# Patient Record
Sex: FEMALE | Race: BLACK OR AFRICAN AMERICAN | Employment: UNEMPLOYED | URBAN - METROPOLITAN AREA
[De-identification: names, ages, dates, MRNs, and addresses within clinical notes are randomized per-mention and may not be internally consistent; named-entity substitution may affect disease eponyms.]

---

## 2017-08-02 ENCOUNTER — OFFICE VISIT (OUTPATIENT)
Dept: OBGYN CLINIC | Age: 52
End: 2017-08-02
Payer: MEDICARE

## 2017-08-02 VITALS
DIASTOLIC BLOOD PRESSURE: 78 MMHG | RESPIRATION RATE: 18 BRPM | HEART RATE: 81 BPM | HEIGHT: 64 IN | WEIGHT: 293 LBS | BODY MASS INDEX: 50.02 KG/M2 | OXYGEN SATURATION: 99 % | SYSTOLIC BLOOD PRESSURE: 121 MMHG

## 2017-08-02 DIAGNOSIS — N95.0 POSTMENOPAUSAL BLEEDING: Primary | ICD-10-CM

## 2017-08-02 DIAGNOSIS — E66.01 MORBID OBESITY WITH BMI OF 60.0-69.9, ADULT (HCC): ICD-10-CM

## 2017-08-02 PROCEDURE — 4004F PT TOBACCO SCREEN RCVD TLK: CPT | Performed by: SPECIALIST

## 2017-08-02 PROCEDURE — 3014F SCREEN MAMMO DOC REV: CPT | Performed by: SPECIALIST

## 2017-08-02 PROCEDURE — 3017F COLORECTAL CA SCREEN DOC REV: CPT | Performed by: SPECIALIST

## 2017-08-02 PROCEDURE — G8427 DOCREV CUR MEDS BY ELIG CLIN: HCPCS | Performed by: SPECIALIST

## 2017-08-02 PROCEDURE — G8417 CALC BMI ABV UP PARAM F/U: HCPCS | Performed by: SPECIALIST

## 2017-08-02 PROCEDURE — 99213 OFFICE O/P EST LOW 20 MIN: CPT | Performed by: SPECIALIST

## 2017-08-02 ASSESSMENT — ENCOUNTER SYMPTOMS
DIARRHEA: 0
COUGH: 0
ABDOMINAL PAIN: 0
APNEA: 0
NAUSEA: 0
ABDOMINAL DISTENTION: 0
EYE PAIN: 0
CONSTIPATION: 0
VOMITING: 0

## 2017-08-14 ENCOUNTER — OFFICE VISIT (OUTPATIENT)
Dept: OBGYN CLINIC | Age: 52
End: 2017-08-14
Payer: MEDICARE

## 2017-08-14 DIAGNOSIS — N95.0 POST-MENOPAUSAL BLEEDING: Primary | ICD-10-CM

## 2017-08-14 PROCEDURE — 76856 US EXAM PELVIC COMPLETE: CPT | Performed by: SPECIALIST

## 2017-08-14 PROCEDURE — 4004F PT TOBACCO SCREEN RCVD TLK: CPT | Performed by: SPECIALIST

## 2017-09-08 ENCOUNTER — OFFICE VISIT (OUTPATIENT)
Dept: OBGYN CLINIC | Age: 52
End: 2017-09-08
Payer: MEDICARE

## 2017-09-08 VITALS
BODY MASS INDEX: 58.22 KG/M2 | DIASTOLIC BLOOD PRESSURE: 74 MMHG | HEART RATE: 81 BPM | WEIGHT: 293 LBS | RESPIRATION RATE: 16 BRPM | SYSTOLIC BLOOD PRESSURE: 116 MMHG

## 2017-09-08 DIAGNOSIS — R93.89 THICKENED ENDOMETRIUM: Primary | ICD-10-CM

## 2017-09-08 DIAGNOSIS — N95.0 POSTMENOPAUSAL BLEEDING: ICD-10-CM

## 2017-09-08 PROCEDURE — G8427 DOCREV CUR MEDS BY ELIG CLIN: HCPCS | Performed by: SPECIALIST

## 2017-09-08 PROCEDURE — G8417 CALC BMI ABV UP PARAM F/U: HCPCS | Performed by: SPECIALIST

## 2017-09-08 PROCEDURE — 3017F COLORECTAL CA SCREEN DOC REV: CPT | Performed by: SPECIALIST

## 2017-09-08 PROCEDURE — 3014F SCREEN MAMMO DOC REV: CPT | Performed by: SPECIALIST

## 2017-09-08 PROCEDURE — 4004F PT TOBACCO SCREEN RCVD TLK: CPT | Performed by: SPECIALIST

## 2017-09-08 PROCEDURE — 99213 OFFICE O/P EST LOW 20 MIN: CPT | Performed by: SPECIALIST

## 2017-09-08 ASSESSMENT — ENCOUNTER SYMPTOMS
COUGH: 0
DIARRHEA: 0
NAUSEA: 0
APNEA: 0
VOMITING: 0
ABDOMINAL PAIN: 0
ABDOMINAL DISTENTION: 0
EYE PAIN: 0
CONSTIPATION: 0

## 2017-09-11 ENCOUNTER — TELEPHONE (OUTPATIENT)
Dept: OBGYN CLINIC | Age: 52
End: 2017-09-11

## 2017-11-21 ENCOUNTER — HOSPITAL ENCOUNTER (OUTPATIENT)
Dept: PREADMISSION TESTING | Age: 52
Discharge: HOME | End: 2017-11-21
Payer: MEDICARE

## 2017-11-30 ENCOUNTER — HOSPITAL ENCOUNTER (OUTPATIENT)
Dept: PREADMISSION TESTING | Age: 52
Discharge: HOME | End: 2017-11-30
Payer: MEDICARE

## 2017-11-30 ENCOUNTER — OFFICE VISIT (OUTPATIENT)
Dept: OBGYN CLINIC | Age: 52
End: 2017-11-30
Payer: MEDICARE

## 2017-11-30 VITALS
OXYGEN SATURATION: 95 % | BODY MASS INDEX: 50.02 KG/M2 | SYSTOLIC BLOOD PRESSURE: 181 MMHG | HEIGHT: 64 IN | HEART RATE: 92 BPM | DIASTOLIC BLOOD PRESSURE: 90 MMHG | RESPIRATION RATE: 20 BRPM | TEMPERATURE: 98.1 F | WEIGHT: 293 LBS

## 2017-11-30 DIAGNOSIS — R93.89 THICKENED ENDOMETRIUM: Primary | ICD-10-CM

## 2017-11-30 DIAGNOSIS — N95.0 POSTMENOPAUSAL BLEEDING: ICD-10-CM

## 2017-11-30 LAB
ABSOLUTE EOS #: 0.1 K/UL (ref 0–0.4)
ABSOLUTE IMMATURE GRANULOCYTE: ABNORMAL K/UL (ref 0–0.3)
ABSOLUTE LYMPH #: 2.1 K/UL (ref 1–4.8)
ABSOLUTE MONO #: 0.5 K/UL (ref 0.1–1.3)
ANION GAP SERPL CALCULATED.3IONS-SCNC: 10 MMOL/L (ref 9–17)
BASOPHILS # BLD: 1 % (ref 0–2)
BASOPHILS ABSOLUTE: 0 K/UL (ref 0–0.2)
BUN BLDV-MCNC: 15 MG/DL (ref 6–20)
BUN/CREAT BLD: ABNORMAL (ref 9–20)
CALCIUM SERPL-MCNC: 9.4 MG/DL (ref 8.6–10.4)
CHLORIDE BLD-SCNC: 100 MMOL/L (ref 98–107)
CO2: 29 MMOL/L (ref 20–31)
CREAT SERPL-MCNC: 0.72 MG/DL (ref 0.5–0.9)
DIFFERENTIAL TYPE: ABNORMAL
EOSINOPHILS RELATIVE PERCENT: 2 % (ref 0–4)
GFR AFRICAN AMERICAN: >60 ML/MIN
GFR NON-AFRICAN AMERICAN: >60 ML/MIN
GFR SERPL CREATININE-BSD FRML MDRD: ABNORMAL ML/MIN/{1.73_M2}
GFR SERPL CREATININE-BSD FRML MDRD: ABNORMAL ML/MIN/{1.73_M2}
GLUCOSE BLD-MCNC: 171 MG/DL (ref 70–99)
HCT VFR BLD CALC: 38.8 % (ref 36–46)
HEMOGLOBIN: 12.6 G/DL (ref 12–16)
IMMATURE GRANULOCYTES: ABNORMAL %
LYMPHOCYTES # BLD: 38 % (ref 24–44)
MCH RBC QN AUTO: 28.9 PG (ref 26–34)
MCHC RBC AUTO-ENTMCNC: 32.4 G/DL (ref 31–37)
MCV RBC AUTO: 89.3 FL (ref 80–100)
MONOCYTES # BLD: 9 % (ref 1–7)
PDW BLD-RTO: 16.5 % (ref 11.5–14.9)
PLATELET # BLD: 323 K/UL (ref 150–450)
PLATELET ESTIMATE: ABNORMAL
PMV BLD AUTO: 8.5 FL (ref 6–12)
POTASSIUM SERPL-SCNC: 4 MMOL/L (ref 3.7–5.3)
RBC # BLD: 4.34 M/UL (ref 4–5.2)
RBC # BLD: ABNORMAL 10*6/UL
SEG NEUTROPHILS: 50 % (ref 36–66)
SEGMENTED NEUTROPHILS ABSOLUTE COUNT: 2.9 K/UL (ref 1.3–9.1)
SODIUM BLD-SCNC: 139 MMOL/L (ref 135–144)
WBC # BLD: 5.6 K/UL (ref 3.5–11)
WBC # BLD: ABNORMAL 10*3/UL

## 2017-11-30 PROCEDURE — 76857 US EXAM PELVIC LIMITED: CPT | Performed by: SPECIALIST

## 2017-11-30 PROCEDURE — 36415 COLL VENOUS BLD VENIPUNCTURE: CPT

## 2017-11-30 PROCEDURE — 80048 BASIC METABOLIC PNL TOTAL CA: CPT

## 2017-11-30 PROCEDURE — 85025 COMPLETE CBC W/AUTO DIFF WBC: CPT

## 2017-11-30 PROCEDURE — 76830 TRANSVAGINAL US NON-OB: CPT | Performed by: SPECIALIST

## 2017-11-30 RX ORDER — INSULIN GLARGINE 100 [IU]/ML
20 INJECTION, SOLUTION SUBCUTANEOUS
COMMUNITY

## 2017-11-30 NOTE — H&P
History Main Topics    Smoking status: Current Every Day Smoker     Packs/day: 0.50    Smokeless tobacco: Never Used    Alcohol use No    Drug use: No    Sexual activity: Not Asked     Other Topics Concern    None     Social History Narrative    None           REVIEW OF SYSTEMS      No Known Allergies    Current Outpatient Prescriptions on File Prior to Encounter   Medication Sig Dispense Refill    Liraglutide (VICTOZA) 18 MG/3ML SOPN SC injection Inject 1.2 mg into the skin daily      docusate sodium (COLACE) 100 MG capsule Take 1 capsule by mouth 2 times daily 20 capsule 1    ipratropium-albuterol (DUONEB) 0.5-2.5 (3) MG/3ML SOLN nebulizer solution Inhale 3 mLs into the lungs every 6 hours as needed for Shortness of Breath 360 mL 0    metoprolol (TOPROL-XL) 50 MG XL tablet Take 50 mg by mouth daily      polyethylene glycol (MIRALAX) PACK packet Take 17 g by mouth daily      lisinopril (PRINIVIL;ZESTRIL) 20 MG tablet Take 20 mg by mouth 2 times daily      gabapentin (NEURONTIN) 600 MG tablet Take 600 mg by mouth 3 times daily      bumetanide (BUMEX) 1 MG tablet Take 1 mg by mouth daily       No current facility-administered medications on file prior to encounter. General health:  [address]. No fever or chills. Skin:  No itching, redness or rash. HEENT:  No headache, epistaxis or sore throat. Glasses,              Neck:  No pain, stiffness or masses. Cardiovascular/Respiratory system:  No chest pain, palpitation or shortness of breath. Gastrointestinal tract: No abdominal pain, nausea, vomiting, diarrhea or constipation. Genitourinary:  No burning on micturition. No hesitancy, urgency, frequency or discoloration of urine. Locomotor:  No bone or joint pains. No swelling. Arthritic pain right ankle use of cane 2-3 years. Neuropsychiatric:  No referable complaints.                  See HPI.    GENERAL PHYSICAL EXAM:     Vitals: BP (!) 181/90 Comment: DIDN'T TAKE B/P PILLS THIS AM YET. Pulse 92   Temp 98.1 °F (36.7 °C) (Oral)   Resp 20   Ht 5' 4\" (1.626 m)   Wt (!) 362 lb (164.2 kg)   LMP 07/10/2017   SpO2 95%   BMI 62.14 kg/m²  Body mass index is 62.14 kg/m². GENERAL APPEARANCE:   Jose Garcia is 45 y.o.,  female, severely obese, nourished, conscious, alert. Does not appear to be distress or pain at this time. SKIN:  Warm, dry, no cyanosis or jaundice. HEAD:  Normocephalic, atraumatic, no swelling or tenderness. EYES:  Pupils equal, reactive to light. EARS:  No discharge, no marked hearing loss. NOSE:  No rhinorrhea, epistaxis or septal deformity. THROAT:  Not congested. No ulceration bleeding or discharge. Fullness of tissue,                  NECK:  No stiffness, trachea central.  No palpable masses or L.N. Short, thick. CHEST:  Symmetrical and equal on expansion. HEART:  RRR S1 > S2. No audible murmurs or gallops. LUNGS:  Equal on expansion, normal breath sounds. No adventitious sounds. ABDOMEN:  Obese. Soft on palpation. No localized tenderness. No guarding or rigidity. No palpable organomegaly. No current pain with palpation. LYMPHATICS:  No palpable cervical lymphadenopathy. LOCOMOTOR, BACK AND SPINE:  No tenderness or deformities. Ambulates with cane. EXTREMITIES:  Symmetrical, no pedal edema. Dianelyss sign negative. No discoloration or ulcerations. Right ankle pain    NEUROLOGIC:  The patient is conscious, alert, oriented, No apparent focal sensory or motor deficits.                                                                                      PROVISIONAL DIAGNOSES / SURGERY:      Post menopausal bleeding, thickened endometrium   D & C, hysteroscopy with myosure Patient Active Problem List    Diagnosis Date Noted    Constipation 09/02/2015    Obesity, morbid, BMI 50 or higher ([facility] 08/31/2015    Pneumonia 08/29/2015    Respiratory failure ([facility] 08/29/2015    Altered mental status 08/29/2015    CECIL (obstructive sleep apnea) 08/29/2015    Diabetes mellitus ([facility] 08/29/2015    Acidosis 08/29/2015    Hypoxia 08/29/2015    Hyperthyroidism 08/29/2015    FERCHO (acute kidney injury) ([facility] 08/29/2015    Post-op pain 08/27/2015           LOU PEREZ NP on 11/30/2017 at 8:16 AM

## 2017-12-01 RX ORDER — LIDOCAINE HYDROCHLORIDE 10 MG/ML
1 INJECTION, SOLUTION EPIDURAL; INFILTRATION; INTRACAUDAL; PERINEURAL
Status: CANCELLED | OUTPATIENT
Start: 2017-12-01 | End: 2017-12-01

## 2017-12-01 RX ORDER — SODIUM CHLORIDE 0.9 % (FLUSH) 0.9 %
10 SYRINGE (ML) INJECTION PRN
Status: CANCELLED | OUTPATIENT
Start: 2017-12-01

## 2017-12-01 RX ORDER — SODIUM CHLORIDE 9 MG/ML
INJECTION, SOLUTION INTRAVENOUS CONTINUOUS
Status: CANCELLED | OUTPATIENT
Start: 2017-12-01

## 2017-12-01 RX ORDER — SODIUM CHLORIDE 0.9 % (FLUSH) 0.9 %
10 SYRINGE (ML) INJECTION EVERY 12 HOURS SCHEDULED
Status: CANCELLED | OUTPATIENT
Start: 2017-12-01

## 2017-12-04 ENCOUNTER — TELEPHONE (OUTPATIENT)
Dept: OBGYN CLINIC | Age: 52
End: 2017-12-04

## 2019-10-21 ENCOUNTER — OFFICE VISIT (OUTPATIENT)
Dept: OBGYN CLINIC | Age: 54
End: 2019-10-21
Payer: MEDICARE

## 2019-10-21 VITALS
DIASTOLIC BLOOD PRESSURE: 81 MMHG | HEART RATE: 74 BPM | SYSTOLIC BLOOD PRESSURE: 155 MMHG | BODY MASS INDEX: 55.32 KG/M2 | WEIGHT: 293 LBS | HEIGHT: 61 IN

## 2019-10-21 DIAGNOSIS — Z12.39 SCREENING FOR BREAST CANCER: ICD-10-CM

## 2019-10-21 DIAGNOSIS — I10 HYPERTENSION, UNSPECIFIED TYPE: ICD-10-CM

## 2019-10-21 DIAGNOSIS — Z01.419 WELL WOMAN EXAM: Primary | ICD-10-CM

## 2019-10-21 PROCEDURE — 99396 PREV VISIT EST AGE 40-64: CPT | Performed by: SPECIALIST

## 2019-10-21 ASSESSMENT — ENCOUNTER SYMPTOMS
NAUSEA: 0
VOMITING: 0
CONSTIPATION: 0
APNEA: 0
COUGH: 0
ABDOMINAL PAIN: 0
DIARRHEA: 0
ABDOMINAL DISTENTION: 0
EYE PAIN: 0

## 2019-11-08 DIAGNOSIS — Z01.419 WELL WOMAN EXAM: ICD-10-CM

## 2019-11-08 LAB — PAP SMEAR: NORMAL

## 2021-03-24 ENCOUNTER — OFFICE VISIT (OUTPATIENT)
Dept: OBGYN CLINIC | Age: 56
End: 2021-03-24
Payer: MEDICARE

## 2021-03-24 ENCOUNTER — HOSPITAL ENCOUNTER (OUTPATIENT)
Age: 56
Setting detail: SPECIMEN
Discharge: HOME | End: 2021-03-24
Payer: MEDICARE

## 2021-03-24 VITALS
BODY MASS INDEX: 50.02 KG/M2 | HEIGHT: 64 IN | SYSTOLIC BLOOD PRESSURE: 144 MMHG | WEIGHT: 293 LBS | HEART RATE: 89 BPM | DIASTOLIC BLOOD PRESSURE: 96 MMHG

## 2021-03-24 DIAGNOSIS — Z12.31 ENCOUNTER FOR SCREENING MAMMOGRAM FOR BREAST CANCER: ICD-10-CM

## 2021-03-24 DIAGNOSIS — Z11.51 SCREENING FOR HUMAN PAPILLOMAVIRUS: ICD-10-CM

## 2021-03-24 DIAGNOSIS — Z01.419 PAP SMEAR, AS PART OF ROUTINE GYNECOLOGICAL EXAMINATION: Primary | ICD-10-CM

## 2021-03-24 PROCEDURE — 99396 PREV VISIT EST AGE 40-64: CPT | Performed by: CLINICAL NURSE SPECIALIST

## 2021-03-24 ASSESSMENT — PATIENT HEALTH QUESTIONNAIRE - PHQ9
1. LITTLE INTEREST OR PLEASURE IN DOING THINGS: 0
SUM OF ALL RESPONSES TO PHQ9 QUESTIONS 1 & 2: 0
SUM OF ALL RESPONSES TO PHQ QUESTIONS 1-9: 0

## 2021-03-24 NOTE — PROGRESS NOTES
[facility]. OB GYN  [address] [facility]  Dept: [phone]        DATE OF VISIT:  3/24/21        History and Physical    Andree Asif    :  1965  CHIEF COMPLAINT:    Chief Complaint   Patient presents with    Annual Exam                    Andree Asif is a 60 y.o. female who presents for annual well woman exam with pap and declined STD screening. The patient was seen and examined. Per the patient bowels areregular. She has no voiding complaints. She denies any bloating as well as vaginal discharge. Chaperone for Intimate Exam   Chaperone was offered and accepted as part of the rooming process.  Chaperone: Frederic Ron     _____________________________________________________________________  Past Medical History:   Diagnosis Date    Asthma     Bronchitis     hx of    [facility]est[facility] failure) [facility]     COPD (chronic obstructive pulmonary disease) ([facility])     Diabetes mellitus ([facility]     H/O being hospitalized 2017    for approx.  1 week with asthma.    H/O cardiac catheterization 10/2014    no stents OP at [address] Last visit 2015     Hypertension     LBBB (left bundle branch block) 2017    On home oxygen therapy     3 liters hooked into the CPAP machine    CECIL on CPAP     uses nightly    Pneumonia     SOB (shortness of breath)     HX OF    Wears glasses                                                                    Past Surgical History:   Procedure Laterality Date     SECTION      x4    LEG TENDON SURGERY Right 2015    was' in ICU AFTER SURGERY FOR FEW DAYS AFTER DISCHARGED FROM [facility] IN , TO EXTENDED CARE FACILITY DUE TO COMPLICATIONS\"    TONSILLECTOMY       Family History   Problem Relation Age of Onset    Diabetes Mother     Hypertension Mother     Kidney Disease Mother     Asthma Father      Social History     Tobacco Use   Smoking Status Current Every Day Smoker    Packs/day: 0.50    Years: 20.00    Pack years: 10.00   Smokeless Tobacco Never Used     Social History     Substance and Sexual Activity   Alcohol Use No     Current Outpatient Medications   Medication Sig Dispense Refill    insulin glargine (LANTUS) 100 UNIT/ML injection vial Inject 10 Units into the skin every morning (before breakfast)      Liraglutide (VICTOZA) 18 MG/3ML SOPN SC injection Inject 1.2 mg into the skin daily      docusate sodium (COLACE) 100 MG capsule Take 1 capsule by mouth 2 times daily 20 capsule 1    ipratropium-albuterol (DUONEB) 0.5-2.5 (3) MG/3ML SOLN nebulizer solution Inhale 3 mLs into the lungs every 6 hours as needed for Shortness of Breath 360 mL 0    metoprolol (TOPROL-XL) 50 MG XL tablet Take 50 mg by mouth daily      polyethylene glycol (MIRALAX) PACK packet Take 17 g by mouth daily      lisinopril (PRINIVIL;ZESTRIL) 20 MG tablet Take 20 mg by mouth 2 times daily      bumetanide (BUMEX) 1 MG tablet Take 1 mg by mouth daily      guaiFENesin (MUCINEX) 600 MG extended release tablet 1 tablet BID       No current facility-administered medications for this visit. Allergies: Allergies   Allergen Reactions    Ipratropium-Albuterol      Patient states she gets tremors    Morphine Other (See Comments)     Other reaction(s): Confusion  Pt was difficult to arouse when she received morphine after surgery     Metformin Anxiety       Gynecologic History:  Patient's last menstrual period was 07/10/2017. Sexually Active: Yes  STD History:No  Birth Control: No    OB History    Para Term  AB Living   4 4 0 0 0 4   SAB TAB Ectopic Molar Multiple Live Births   0 0 0 0 0 0     ______________________________________________________________________    Review of Systems    REVIEW OFSYSTEMS:        Constitutional:  Unexpected weight change over the last several months she has gained approx.  20 lbs , extreme fatigue, night sweats              yes  Skin: Rashes, moles  has some dark spots on lower legs with pain, she reports that she had a doppler study and was WNL  yes  Neurological:  Frequentheadaches, seizures         no  Ophthalmic:  Recent visual changes patient is due for cataract removal yes  ENT:   Difficulty swallowing  no  Breast:              Masses, pain, nipple discharge                            no     Respiratory:  Shortness of breath due to asthma, coughing           yes    Cardiovascular: Chest pain   no     Gastrointestinal: Chronic diarrhea/constipation, nausea/vomiting           no   Urogenital:  Urinary incontinence, frequency, urgency          no                                         Heavy/irregular periods           no                                      Vaginal discharge                   no  Hematological: Bruises easy   no     Endocrine:  Hot flashes   no     Hot/Cold Intolerance  no    Psychological:            Mood and affect were within normal limits. no                 Physical Exam    Physical Exam:    Vitals:    03/24/21 1146 03/24/21 1202   BP: (!) 195/116 (!) 144/96   Site: Left Upper Arm    Position: Sitting    Cuff Size: Large Adult    Pulse: 89    Weight: (!) 377 lb (171 kg)    Height: 5' 4\" (1.626 m)        General Appearance: This  is a well developed, well nourished, well groomed female. Her BMI was reviewed. Nutritional decision making andexercise were discussed. Neurological:  The patient is alert and oriented to time,place, person, and situation    Skin:  A brief inspection of the skin revealed no rashes or lesions. Neck:  The neck was supple. Respiratory: There was unlabored respiratory effort. Lungs clear to ascultation. Cardiovascular: The patients extremities were without calf tenderness or edema. Heart with a regular rate and rhythm. Abdomen: The abdomen was soft and non-tender with no guarding, rebound or rigidity. No hernias were appreciated.      Breast:   The patients

## 2021-03-26 LAB
HPV SAMPLE: NORMAL
HPV, GENOTYPE 16: NOT DETECTED
HPV, GENOTYPE 18: NOT DETECTED
HPV, HIGH RISK OTHER: NOT DETECTED
HPV, INTERPRETATION: NORMAL
SPECIMEN DESCRIPTION: NORMAL

## 2021-03-30 LAB — CYTOLOGY REPORT: NORMAL

## 2021-04-02 ENCOUNTER — APPOINTMENT (OUTPATIENT)
Dept: GENERAL RADIOLOGY | Age: 56
DRG: 074 | End: 2021-04-02
Attending: FAMILY MEDICINE
Payer: MEDICARE

## 2021-04-02 ENCOUNTER — HOSPITAL ENCOUNTER (INPATIENT)
Age: 56
LOS: 3 days | Discharge: HOME | DRG: 074 | End: 2021-04-05
Attending: FAMILY MEDICINE | Admitting: FAMILY MEDICINE
Payer: MEDICARE

## 2021-04-02 PROBLEM — L03.115 CELLULITIS OF RIGHT LEG: Status: ACTIVE | Noted: 2021-04-02

## 2021-04-02 LAB
ABSOLUTE EOS #: 0.06 K/UL (ref 0–0.44)
ABSOLUTE IMMATURE GRANULOCYTE: 0.01 K/UL (ref 0–0.3)
ABSOLUTE LYMPH #: 2.16 K/UL (ref 1.1–3.7)
ABSOLUTE MONO #: 0.48 K/UL (ref 0.1–1.2)
ANION GAP SERPL CALCULATED.3IONS-SCNC: 13 MMOL/L (ref 9–17)
BASOPHILS # BLD: 1 % (ref 0–2)
BASOPHILS ABSOLUTE: 0.03 K/UL (ref 0–0.2)
BUN BLDV-MCNC: 19 MG/DL (ref 6–20)
BUN/CREAT BLD: 25 (ref 9–20)
CALCIUM SERPL-MCNC: 9.2 MG/DL (ref 8.6–10.4)
CHLORIDE BLD-SCNC: 103 MMOL/L (ref 98–107)
CO2: 27 MMOL/L (ref 20–31)
CREAT SERPL-MCNC: 0.75 MG/DL (ref 0.5–0.9)
DIFFERENTIAL TYPE: ABNORMAL
EOSINOPHILS RELATIVE PERCENT: 1 % (ref 1–4)
GFR AFRICAN AMERICAN: >60 ML/MIN
GFR NON-AFRICAN AMERICAN: >60 ML/MIN
GFR SERPL CREATININE-BSD FRML MDRD: ABNORMAL ML/MIN/{1.73_M2}
GFR SERPL CREATININE-BSD FRML MDRD: ABNORMAL ML/MIN/{1.73_M2}
GLUCOSE BLD-MCNC: 106 MG/DL (ref 70–99)
GLUCOSE BLD-MCNC: 92 MG/DL (ref 65–105)
HCT VFR BLD CALC: 44.9 % (ref 36.3–47.1)
HEMOGLOBIN: 13.3 G/DL (ref 11.9–15.1)
IMMATURE GRANULOCYTES: 0 %
LYMPHOCYTES # BLD: 46 % (ref 24–43)
MCH RBC QN AUTO: 28.2 PG (ref 25.2–33.5)
MCHC RBC AUTO-ENTMCNC: 29.6 G/DL (ref 28.4–34.8)
MCV RBC AUTO: 95.1 FL (ref 82.6–102.9)
MONOCYTES # BLD: 10 % (ref 3–12)
NRBC AUTOMATED: 0 PER 100 WBC
PDW BLD-RTO: 14.2 % (ref 11.8–14.4)
PLATELET # BLD: 220 K/UL (ref 138–453)
PLATELET ESTIMATE: ABNORMAL
PMV BLD AUTO: 10.4 FL (ref 8.1–13.5)
POTASSIUM SERPL-SCNC: 4.1 MMOL/L (ref 3.7–5.3)
RBC # BLD: 4.72 M/UL (ref 3.95–5.11)
RBC # BLD: ABNORMAL 10*6/UL
SEDIMENTATION RATE, ERYTHROCYTE: 85 MM (ref 0–30)
SEG NEUTROPHILS: 42 % (ref 36–65)
SEGMENTED NEUTROPHILS ABSOLUTE COUNT: 2.01 K/UL (ref 1.5–8.1)
SODIUM BLD-SCNC: 143 MMOL/L (ref 135–144)
WBC # BLD: 4.8 K/UL (ref 3.5–11.3)
WBC # BLD: ABNORMAL 10*3/UL

## 2021-04-02 PROCEDURE — 85652 RBC SED RATE AUTOMATED: CPT

## 2021-04-02 PROCEDURE — 6370000000 HC RX 637 (ALT 250 FOR IP): Performed by: FAMILY MEDICINE

## 2021-04-02 PROCEDURE — 85025 COMPLETE CBC W/AUTO DIFF WBC: CPT

## 2021-04-02 PROCEDURE — 80048 BASIC METABOLIC PNL TOTAL CA: CPT

## 2021-04-02 PROCEDURE — 86140 C-REACTIVE PROTEIN: CPT

## 2021-04-02 PROCEDURE — 36415 COLL VENOUS BLD VENIPUNCTURE: CPT

## 2021-04-02 PROCEDURE — 1200000000 HC SEMI PRIVATE

## 2021-04-02 PROCEDURE — 83036 HEMOGLOBIN GLYCOSYLATED A1C: CPT

## 2021-04-02 PROCEDURE — 71045 X-RAY EXAM CHEST 1 VIEW: CPT

## 2021-04-02 PROCEDURE — 82947 ASSAY GLUCOSE BLOOD QUANT: CPT

## 2021-04-02 PROCEDURE — 87040 BLOOD CULTURE FOR BACTERIA: CPT

## 2021-04-02 PROCEDURE — 2580000003 HC RX 258: Performed by: FAMILY MEDICINE

## 2021-04-02 RX ORDER — ACETAMINOPHEN 325 MG/1
650 TABLET ORAL EVERY 6 HOURS PRN
Status: DISCONTINUED | OUTPATIENT
Start: 2021-04-02 | End: 2021-04-05 | Stop reason: HOSPADM

## 2021-04-02 RX ORDER — SODIUM CHLORIDE 0.9 % (FLUSH) 0.9 %
10 SYRINGE (ML) INJECTION PRN
Status: DISCONTINUED | OUTPATIENT
Start: 2021-04-02 | End: 2021-04-05 | Stop reason: HOSPADM

## 2021-04-02 RX ORDER — ONDANSETRON 2 MG/ML
4 INJECTION INTRAMUSCULAR; INTRAVENOUS EVERY 6 HOURS PRN
Status: DISCONTINUED | OUTPATIENT
Start: 2021-04-02 | End: 2021-04-05 | Stop reason: HOSPADM

## 2021-04-02 RX ORDER — MAGNESIUM SULFATE 1 G/100ML
1000 INJECTION INTRAVENOUS PRN
Status: DISCONTINUED | OUTPATIENT
Start: 2021-04-02 | End: 2021-04-05 | Stop reason: HOSPADM

## 2021-04-02 RX ORDER — POTASSIUM CHLORIDE 7.45 MG/ML
10 INJECTION INTRAVENOUS PRN
Status: DISCONTINUED | OUTPATIENT
Start: 2021-04-02 | End: 2021-04-05 | Stop reason: HOSPADM

## 2021-04-02 RX ORDER — NICOTINE 21 MG/24HR
1 PATCH, TRANSDERMAL 24 HOURS TRANSDERMAL DAILY PRN
Status: DISCONTINUED | OUTPATIENT
Start: 2021-04-02 | End: 2021-04-05 | Stop reason: HOSPADM

## 2021-04-02 RX ORDER — SODIUM CHLORIDE 9 MG/ML
25 INJECTION, SOLUTION INTRAVENOUS PRN
Status: DISCONTINUED | OUTPATIENT
Start: 2021-04-02 | End: 2021-04-05 | Stop reason: HOSPADM

## 2021-04-02 RX ORDER — METOPROLOL SUCCINATE 50 MG/1
50 TABLET, EXTENDED RELEASE ORAL DAILY
Status: DISCONTINUED | OUTPATIENT
Start: 2021-04-03 | End: 2021-04-03

## 2021-04-02 RX ORDER — DEXTROSE MONOHYDRATE 25 G/50ML
12.5 INJECTION, SOLUTION INTRAVENOUS PRN
Status: DISCONTINUED | OUTPATIENT
Start: 2021-04-02 | End: 2021-04-05 | Stop reason: HOSPADM

## 2021-04-02 RX ORDER — OXYCODONE HYDROCHLORIDE AND ACETAMINOPHEN 5; 325 MG/1; MG/1
1 TABLET ORAL EVERY 8 HOURS PRN
COMMUNITY

## 2021-04-02 RX ORDER — POTASSIUM CHLORIDE 20 MEQ/1
40 TABLET, EXTENDED RELEASE ORAL PRN
Status: DISCONTINUED | OUTPATIENT
Start: 2021-04-02 | End: 2021-04-05 | Stop reason: HOSPADM

## 2021-04-02 RX ORDER — PROMETHAZINE HYDROCHLORIDE 12.5 MG/1
12.5 TABLET ORAL EVERY 6 HOURS PRN
Status: DISCONTINUED | OUTPATIENT
Start: 2021-04-02 | End: 2021-04-05 | Stop reason: HOSPADM

## 2021-04-02 RX ORDER — POLYETHYLENE GLYCOL 3350 17 G/17G
17 POWDER, FOR SOLUTION ORAL DAILY
Status: DISCONTINUED | OUTPATIENT
Start: 2021-04-03 | End: 2021-04-05 | Stop reason: HOSPADM

## 2021-04-02 RX ORDER — LISINOPRIL 20 MG/1
20 TABLET ORAL 2 TIMES DAILY
Status: DISCONTINUED | OUTPATIENT
Start: 2021-04-02 | End: 2021-04-05 | Stop reason: HOSPADM

## 2021-04-02 RX ORDER — ACETAMINOPHEN 650 MG/1
650 SUPPOSITORY RECTAL EVERY 6 HOURS PRN
Status: DISCONTINUED | OUTPATIENT
Start: 2021-04-02 | End: 2021-04-05 | Stop reason: HOSPADM

## 2021-04-02 RX ORDER — FAMOTIDINE 20 MG/1
20 TABLET, FILM COATED ORAL 2 TIMES DAILY
Status: DISCONTINUED | OUTPATIENT
Start: 2021-04-02 | End: 2021-04-05 | Stop reason: HOSPADM

## 2021-04-02 RX ORDER — SODIUM CHLORIDE 0.9 % (FLUSH) 0.9 %
10 SYRINGE (ML) INJECTION EVERY 12 HOURS SCHEDULED
Status: DISCONTINUED | OUTPATIENT
Start: 2021-04-02 | End: 2021-04-05 | Stop reason: HOSPADM

## 2021-04-02 RX ORDER — NICOTINE POLACRILEX 4 MG
15 LOZENGE BUCCAL PRN
Status: DISCONTINUED | OUTPATIENT
Start: 2021-04-02 | End: 2021-04-05 | Stop reason: HOSPADM

## 2021-04-02 RX ORDER — HYDROCODONE BITARTRATE AND ACETAMINOPHEN 5; 325 MG/1; MG/1
2 TABLET ORAL EVERY 4 HOURS PRN
Status: DISCONTINUED | OUTPATIENT
Start: 2021-04-02 | End: 2021-04-03

## 2021-04-02 RX ORDER — DEXTROSE MONOHYDRATE 50 MG/ML
100 INJECTION, SOLUTION INTRAVENOUS PRN
Status: DISCONTINUED | OUTPATIENT
Start: 2021-04-02 | End: 2021-04-05 | Stop reason: HOSPADM

## 2021-04-02 RX ORDER — INSULIN GLARGINE 100 [IU]/ML
20 INJECTION, SOLUTION SUBCUTANEOUS
Status: DISCONTINUED | OUTPATIENT
Start: 2021-04-03 | End: 2021-04-05 | Stop reason: HOSPADM

## 2021-04-02 RX ADMIN — FAMOTIDINE 20 MG: 20 TABLET ORAL at 22:42

## 2021-04-02 RX ADMIN — LISINOPRIL 20 MG: 20 TABLET ORAL at 22:42

## 2021-04-02 RX ADMIN — SODIUM CHLORIDE, PRESERVATIVE FREE 10 ML: 5 INJECTION INTRAVENOUS at 22:35

## 2021-04-02 ASSESSMENT — PAIN SCALES - GENERAL: PAINLEVEL_OUTOF10: 0

## 2021-04-03 ENCOUNTER — APPOINTMENT (OUTPATIENT)
Dept: GENERAL RADIOLOGY | Age: 56
DRG: 074 | End: 2021-04-03
Attending: FAMILY MEDICINE
Payer: MEDICARE

## 2021-04-03 LAB
ABSOLUTE EOS #: 0.07 K/UL (ref 0–0.44)
ABSOLUTE IMMATURE GRANULOCYTE: 0.01 K/UL (ref 0–0.3)
ABSOLUTE LYMPH #: 1.99 K/UL (ref 1.1–3.7)
ABSOLUTE MONO #: 0.58 K/UL (ref 0.1–1.2)
ANION GAP SERPL CALCULATED.3IONS-SCNC: 9 MMOL/L (ref 9–17)
BASOPHILS # BLD: 0 % (ref 0–2)
BASOPHILS ABSOLUTE: <0.03 K/UL (ref 0–0.2)
BUN BLDV-MCNC: 17 MG/DL (ref 6–20)
BUN/CREAT BLD: 22 (ref 9–20)
C-REACTIVE PROTEIN: <3 MG/L (ref 0–5)
CALCIUM SERPL-MCNC: 8.9 MG/DL (ref 8.6–10.4)
CHLORIDE BLD-SCNC: 101 MMOL/L (ref 98–107)
CO2: 29 MMOL/L (ref 20–31)
CREAT SERPL-MCNC: 0.77 MG/DL (ref 0.5–0.9)
DIFFERENTIAL TYPE: NORMAL
EKG ATRIAL RATE: 84 BPM
EKG P AXIS: 66 DEGREES
EKG P-R INTERVAL: 160 MS
EKG Q-T INTERVAL: 372 MS
EKG QRS DURATION: 78 MS
EKG QTC CALCULATION (BAZETT): 439 MS
EKG R AXIS: -40 DEGREES
EKG T AXIS: 68 DEGREES
EKG VENTRICULAR RATE: 84 BPM
EOSINOPHILS RELATIVE PERCENT: 2 % (ref 1–4)
GFR AFRICAN AMERICAN: >60 ML/MIN
GFR NON-AFRICAN AMERICAN: >60 ML/MIN
GFR SERPL CREATININE-BSD FRML MDRD: ABNORMAL ML/MIN/{1.73_M2}
GFR SERPL CREATININE-BSD FRML MDRD: ABNORMAL ML/MIN/{1.73_M2}
GLUCOSE BLD-MCNC: 106 MG/DL (ref 65–105)
GLUCOSE BLD-MCNC: 116 MG/DL (ref 65–105)
GLUCOSE BLD-MCNC: 120 MG/DL (ref 65–105)
GLUCOSE BLD-MCNC: 131 MG/DL (ref 70–99)
GLUCOSE BLD-MCNC: 252 MG/DL (ref 65–105)
HCT VFR BLD CALC: 43.8 % (ref 36.3–47.1)
HEMOGLOBIN: 13.1 G/DL (ref 11.9–15.1)
IMMATURE GRANULOCYTES: 0 %
INR BLD: 1.1
LYMPHOCYTES # BLD: 42 % (ref 24–43)
MCH RBC QN AUTO: 28.9 PG (ref 25.2–33.5)
MCHC RBC AUTO-ENTMCNC: 29.9 G/DL (ref 28.4–34.8)
MCV RBC AUTO: 96.5 FL (ref 82.6–102.9)
MONOCYTES # BLD: 12 % (ref 3–12)
NRBC AUTOMATED: 0 PER 100 WBC
PDW BLD-RTO: 14 % (ref 11.8–14.4)
PLATELET # BLD: 229 K/UL (ref 138–453)
PLATELET ESTIMATE: NORMAL
PMV BLD AUTO: 10.4 FL (ref 8.1–13.5)
POTASSIUM SERPL-SCNC: 4.2 MMOL/L (ref 3.7–5.3)
PROTHROMBIN TIME: 14 SEC (ref 11.5–14.2)
RBC # BLD: 4.54 M/UL (ref 3.95–5.11)
RBC # BLD: NORMAL 10*6/UL
SARS-COV-2, RAPID: NOT DETECTED
SEG NEUTROPHILS: 44 % (ref 36–65)
SEGMENTED NEUTROPHILS ABSOLUTE COUNT: 2.1 K/UL (ref 1.5–8.1)
SODIUM BLD-SCNC: 139 MMOL/L (ref 135–144)
SPECIMEN DESCRIPTION: NORMAL
WBC # BLD: 4.8 K/UL (ref 3.5–11.3)
WBC # BLD: NORMAL 10*3/UL

## 2021-04-03 PROCEDURE — 97530 THERAPEUTIC ACTIVITIES: CPT

## 2021-04-03 PROCEDURE — 6360000002 HC RX W HCPCS: Performed by: FAMILY MEDICINE

## 2021-04-03 PROCEDURE — 73610 X-RAY EXAM OF ANKLE: CPT

## 2021-04-03 PROCEDURE — 2580000003 HC RX 258: Performed by: FAMILY MEDICINE

## 2021-04-03 PROCEDURE — 93005 ELECTROCARDIOGRAM TRACING: CPT | Performed by: FAMILY MEDICINE

## 2021-04-03 PROCEDURE — 99222 1ST HOSP IP/OBS MODERATE 55: CPT | Performed by: INTERNAL MEDICINE

## 2021-04-03 PROCEDURE — 2580000003 HC RX 258: Performed by: INTERNAL MEDICINE

## 2021-04-03 PROCEDURE — 93971 EXTREMITY STUDY: CPT

## 2021-04-03 PROCEDURE — 6360000002 HC RX W HCPCS: Performed by: INTERNAL MEDICINE

## 2021-04-03 PROCEDURE — 97163 PT EVAL HIGH COMPLEX 45 MIN: CPT

## 2021-04-03 PROCEDURE — 85610 PROTHROMBIN TIME: CPT

## 2021-04-03 PROCEDURE — 87635 SARS-COV-2 COVID-19 AMP PRB: CPT

## 2021-04-03 PROCEDURE — 6360000002 HC RX W HCPCS: Performed by: HOSPITALIST

## 2021-04-03 PROCEDURE — 82947 ASSAY GLUCOSE BLOOD QUANT: CPT

## 2021-04-03 PROCEDURE — 85025 COMPLETE CBC W/AUTO DIFF WBC: CPT

## 2021-04-03 PROCEDURE — 97535 SELF CARE MNGMENT TRAINING: CPT

## 2021-04-03 PROCEDURE — 80048 BASIC METABOLIC PNL TOTAL CA: CPT

## 2021-04-03 PROCEDURE — 97116 GAIT TRAINING THERAPY: CPT

## 2021-04-03 PROCEDURE — 6370000000 HC RX 637 (ALT 250 FOR IP): Performed by: FAMILY MEDICINE

## 2021-04-03 PROCEDURE — 36415 COLL VENOUS BLD VENIPUNCTURE: CPT

## 2021-04-03 PROCEDURE — 97165 OT EVAL LOW COMPLEX 30 MIN: CPT

## 2021-04-03 PROCEDURE — 1200000000 HC SEMI PRIVATE

## 2021-04-03 RX ORDER — ROSUVASTATIN CALCIUM 40 MG/1
40 TABLET, COATED ORAL DAILY
Status: DISCONTINUED | OUTPATIENT
Start: 2021-04-03 | End: 2021-04-05 | Stop reason: HOSPADM

## 2021-04-03 RX ORDER — FUROSEMIDE 20 MG/1
20 TABLET ORAL 2 TIMES DAILY
COMMUNITY

## 2021-04-03 RX ORDER — FUROSEMIDE 20 MG/1
20 TABLET ORAL 2 TIMES DAILY
Status: DISCONTINUED | OUTPATIENT
Start: 2021-04-03 | End: 2021-04-05 | Stop reason: HOSPADM

## 2021-04-03 RX ORDER — CARVEDILOL 12.5 MG/1
12.5 TABLET ORAL 2 TIMES DAILY WITH MEALS
COMMUNITY

## 2021-04-03 RX ORDER — HYDROCODONE BITARTRATE AND ACETAMINOPHEN 5; 325 MG/1; MG/1
2 TABLET ORAL EVERY 4 HOURS PRN
Status: DISCONTINUED | OUTPATIENT
Start: 2021-04-03 | End: 2021-04-05 | Stop reason: HOSPADM

## 2021-04-03 RX ORDER — PIOGLITAZONEHYDROCHLORIDE 15 MG/1
15 TABLET ORAL DAILY
Status: DISCONTINUED | OUTPATIENT
Start: 2021-04-03 | End: 2021-04-05 | Stop reason: HOSPADM

## 2021-04-03 RX ORDER — PIOGLITAZONEHYDROCHLORIDE 30 MG/1
15 TABLET ORAL DAILY
COMMUNITY

## 2021-04-03 RX ORDER — ALLOPURINOL 100 MG/1
100 TABLET ORAL DAILY
COMMUNITY

## 2021-04-03 RX ORDER — COLCHICINE 0.6 MG/1
0.6 TABLET ORAL DAILY
Status: DISCONTINUED | OUTPATIENT
Start: 2021-04-03 | End: 2021-04-05 | Stop reason: HOSPADM

## 2021-04-03 RX ORDER — HYDROCODONE BITARTRATE AND ACETAMINOPHEN 5; 325 MG/1; MG/1
1 TABLET ORAL EVERY 4 HOURS PRN
Status: DISCONTINUED | OUTPATIENT
Start: 2021-04-03 | End: 2021-04-05 | Stop reason: HOSPADM

## 2021-04-03 RX ORDER — ALLOPURINOL 100 MG/1
100 TABLET ORAL DAILY
Status: DISCONTINUED | OUTPATIENT
Start: 2021-04-03 | End: 2021-04-05 | Stop reason: HOSPADM

## 2021-04-03 RX ORDER — CARVEDILOL 12.5 MG/1
12.5 TABLET ORAL 2 TIMES DAILY WITH MEALS
Status: DISCONTINUED | OUTPATIENT
Start: 2021-04-03 | End: 2021-04-05 | Stop reason: HOSPADM

## 2021-04-03 RX ORDER — OXYCODONE HYDROCHLORIDE AND ACETAMINOPHEN 5; 325 MG/1; MG/1
1 TABLET ORAL EVERY 8 HOURS PRN
Status: DISCONTINUED | OUTPATIENT
Start: 2021-04-03 | End: 2021-04-04

## 2021-04-03 RX ORDER — COLCHICINE 0.6 MG/1
0.6 TABLET ORAL DAILY
COMMUNITY

## 2021-04-03 RX ORDER — HYDRALAZINE HYDROCHLORIDE 20 MG/ML
10 INJECTION INTRAMUSCULAR; INTRAVENOUS EVERY 6 HOURS PRN
Status: DISCONTINUED | OUTPATIENT
Start: 2021-04-03 | End: 2021-04-05 | Stop reason: HOSPADM

## 2021-04-03 RX ORDER — ROSUVASTATIN CALCIUM 40 MG/1
40 TABLET, COATED ORAL DAILY
COMMUNITY

## 2021-04-03 RX ADMIN — ENOXAPARIN SODIUM 40 MG: 40 INJECTION SUBCUTANEOUS at 19:35

## 2021-04-03 RX ADMIN — SODIUM CHLORIDE, PRESERVATIVE FREE 10 ML: 5 INJECTION INTRAVENOUS at 13:33

## 2021-04-03 RX ADMIN — HYDROCODONE BITARTRATE AND ACETAMINOPHEN 2 TABLET: 5; 325 TABLET ORAL at 19:35

## 2021-04-03 RX ADMIN — FUROSEMIDE 20 MG: 20 TABLET ORAL at 17:15

## 2021-04-03 RX ADMIN — CEFEPIME HYDROCHLORIDE 2000 MG: 2 INJECTION, POWDER, FOR SOLUTION INTRAVENOUS at 16:01

## 2021-04-03 RX ADMIN — ALLOPURINOL 100 MG: 100 TABLET ORAL at 19:56

## 2021-04-03 RX ADMIN — ENOXAPARIN SODIUM 40 MG: 40 INJECTION SUBCUTANEOUS at 08:00

## 2021-04-03 RX ADMIN — CARVEDILOL 12.5 MG: 12.5 TABLET, FILM COATED ORAL at 17:07

## 2021-04-03 RX ADMIN — HYDRALAZINE HYDROCHLORIDE 10 MG: 20 INJECTION INTRAMUSCULAR; INTRAVENOUS at 22:06

## 2021-04-03 RX ADMIN — COLCHICINE 0.6 MG: 0.6 TABLET ORAL at 19:56

## 2021-04-03 RX ADMIN — FAMOTIDINE 20 MG: 20 TABLET ORAL at 08:01

## 2021-04-03 RX ADMIN — FAMOTIDINE 20 MG: 20 TABLET ORAL at 19:36

## 2021-04-03 RX ADMIN — INSULIN LISPRO 9 UNITS: 100 INJECTION, SOLUTION INTRAVENOUS; SUBCUTANEOUS at 13:33

## 2021-04-03 RX ADMIN — VANCOMYCIN HYDROCHLORIDE 2500 MG: 5 INJECTION, POWDER, LYOPHILIZED, FOR SOLUTION INTRAVENOUS at 00:57

## 2021-04-03 RX ADMIN — HYDROCODONE BITARTRATE AND ACETAMINOPHEN 2 TABLET: 5; 325 TABLET ORAL at 05:59

## 2021-04-03 RX ADMIN — SODIUM CHLORIDE, PRESERVATIVE FREE 10 ML: 5 INJECTION INTRAVENOUS at 08:01

## 2021-04-03 RX ADMIN — PIOGLITAZONE 15 MG: 15 TABLET ORAL at 19:37

## 2021-04-03 RX ADMIN — LISINOPRIL 20 MG: 20 TABLET ORAL at 08:00

## 2021-04-03 RX ADMIN — VANCOMYCIN HYDROCHLORIDE 1500 MG: 5 INJECTION, POWDER, LYOPHILIZED, FOR SOLUTION INTRAVENOUS at 13:33

## 2021-04-03 RX ADMIN — ROSUVASTATIN CALCIUM 40 MG: 40 TABLET, FILM COATED ORAL at 19:37

## 2021-04-03 RX ADMIN — LISINOPRIL 20 MG: 20 TABLET ORAL at 19:36

## 2021-04-03 RX ADMIN — POLYETHYLENE GLYCOL 3350 17 G: 17 POWDER, FOR SOLUTION ORAL at 08:01

## 2021-04-03 ASSESSMENT — PAIN DESCRIPTION - FREQUENCY
FREQUENCY: CONTINUOUS

## 2021-04-03 ASSESSMENT — ENCOUNTER SYMPTOMS
SHORTNESS OF BREATH: 1
DIARRHEA: 0
COLOR CHANGE: 1
VOMITING: 0
NAUSEA: 0

## 2021-04-03 ASSESSMENT — PAIN DESCRIPTION - PAIN TYPE
TYPE: ACUTE PAIN

## 2021-04-03 ASSESSMENT — PAIN DESCRIPTION - LOCATION
LOCATION: LEG
LOCATION: LEG

## 2021-04-03 ASSESSMENT — PAIN - FUNCTIONAL ASSESSMENT
PAIN_FUNCTIONAL_ASSESSMENT: PREVENTS OR INTERFERES SOME ACTIVE ACTIVITIES AND ADLS
PAIN_FUNCTIONAL_ASSESSMENT: PREVENTS OR INTERFERES SOME ACTIVE ACTIVITIES AND ADLS

## 2021-04-03 ASSESSMENT — PAIN DESCRIPTION - DESCRIPTORS: DESCRIPTORS: ACHING;DISCOMFORT

## 2021-04-03 ASSESSMENT — PAIN SCALES - GENERAL
PAINLEVEL_OUTOF10: 7
PAINLEVEL_OUTOF10: 6
PAINLEVEL_OUTOF10: 4

## 2021-04-03 ASSESSMENT — PAIN DESCRIPTION - PROGRESSION: CLINICAL_PROGRESSION: NOT CHANGED

## 2021-04-03 ASSESSMENT — PAIN DESCRIPTION - ORIENTATION
ORIENTATION: RIGHT;LOWER
ORIENTATION: RIGHT;LOWER

## 2021-04-03 ASSESSMENT — PAIN DESCRIPTION - ONSET
ONSET: ON-GOING
ONSET: ON-GOING

## 2021-04-03 NOTE — PLAN OF CARE
improve  Description: Maintenance of adequate nutrition will improve  Outcome: Ongoing  Goal: Progress toward achieving an optimal weight will improve  Description: Progress toward achieving an optimal weight will improve  Outcome: Ongoing     Problem: Physical Regulation:  Goal: Complications related to the disease process, condition or treatment will be avoided or minimized  Description: Complications related to the disease process, condition or treatment will be avoided or minimized  Outcome: Ongoing  Goal: Diagnostic test results will improve  Description: Diagnostic test results will improve  Outcome: Ongoing     Problem: Tissue Perfusion:  Goal: Adequacy of tissue perfusion will improve  Description: Adequacy of tissue perfusion will improve  Outcome: Ongoing

## 2021-04-03 NOTE — PLAN OF CARE
Problem: Falls - Risk of:  Goal: Will remain free from falls  Description: Will remain free from falls  4/3/2021 1316 by Sravan Tirado RN  Outcome: Ongoing  Note: Room free of clutter  Hourly rounding   Non-skid socks worn  Side rails up x2  Bed low and locked  Call light in reach  Instructed to call out before getting out of bed  Anticipatory needs met  Bed alarm on  Falling star at the door and on wristband       Problem: Falls - Risk of:  Goal: Absence of physical injury  Description: Absence of physical injury  4/3/2021 1316 by Sravan Tirado RN  Outcome: Ongoing     Problem: Skin Integrity:  Goal: Will show no infection signs and symptoms  Description: Will show no infection signs and symptoms  4/3/2021 1316 by Sravan Tirado RN  Outcome: Ongoing     Problem: Skin Integrity:  Goal: Absence of new skin breakdown  Description: Absence of new skin breakdown  4/3/2021 1316 by Sravan Tirado RN  Outcome: Ongoing     Problem: Skin Integrity:  Goal: Risk for impaired skin integrity will decrease  Description: Risk for impaired skin integrity will decrease  4/3/2021 1316 by Sravan Tirado RN  Outcome: Ongoing     Problem: Tobacco Use:  Goal: Inpatient tobacco use cessation counseling participation  Description: Inpatient tobacco use cessation counseling participation  4/3/2021 1316 by Sravan Tirado RN  Outcome: Ongoing  Note: Patient agreeable to smoking cessation at this time  Nicotine patch ordered  Patient accepted nicotine patch       Problem:  Activity:  Goal: Risk for activity intolerance will decrease  Description: Risk for activity intolerance will decrease  4/3/2021 1316 by Sravan Tirado RN  Outcome: Ongoing     Problem: Coping:  Goal: Ability to adjust to condition or change in health will improve  Description: Ability to adjust to condition or change in health will improve  4/3/2021 1316 by Sravan Tirado RN  Outcome: Ongoing Problem: Fluid Volume:  Goal: Ability to maintain a balanced intake and output will improve  Description: Ability to maintain a balanced intake and output will improve  4/3/2021 1316 by Brii Chung RN  Outcome: Ongoing     Problem: Health Behavior:  Goal: Ability to identify and utilize available resources and services will improve  Description: Ability to identify and utilize available resources and services will improve  4/3/2021 1316 by Brii Chung RN  Outcome: Ongoing     Problem: Health Behavior:  Goal: Ability to manage health-related needs will improve  Description: Ability to manage health-related needs will improve  4/3/2021 1316 by Brii Chung RN  Outcome: Ongoing     Problem: Tissue Perfusion:  Goal: Adequacy of tissue perfusion will improve  Description: Adequacy of tissue perfusion will improve  4/3/2021 1316 by Brii Chung RN  Outcome: Ongoing     Problem: ABCDS Injury Assessment  Goal: Absence of physical injury  Outcome: Ongoing  Note: Non-skid socks worn  Clutter free environment  Bed in lowest position  Bed positioned for exit on stronger side  Proper lighting provided  Shoes worn during ambulation  Postural hypotension assessed       Problem: SAFETY  Goal: LTG - Patient will demonstrate safety requirements appropriate to situation/environment  Outcome: Ongoing  Note: Proper pt identification  Hourly rounding performed  Anticipatory needs met  Non-skid socks worn  Proper transferring technique  2/4 side rails up  Personal necessities within reach  Bed low and locked  Call light in reach  Proper lighting  Room free of clutter  Continue to monitor

## 2021-04-03 NOTE — PROGRESS NOTES
Learning About the Safe Use of Antibiotics  Introduction  Antibiotics are drugs used to kill bacteria. Bacteria can cause infections. These include strep throat, ear infections, and pneumonia. These medicines can't cure everything. They don't kill viruses or help with allergies. They don't help illnesses such as the common cold, the flu, or a runny nose. And they can cause side effects. There are many types of antibiotics. Your doctor will decide which one will work best for your infection. Examples include:  · Amoxicillin. · Cephalexin (Keflex). · Ciprofloxacin (Cipro). What are the possible side effects? Side effects can include:  · Nausea. · Diarrhea. · Skin rash. · Yeast infection. · A severe allergic reaction. It may cause itching, swelling, and breathing problems. This is rare. You may have other side effects or reactions not listed here. Check the information that comes with your medicine. Should you take antibiotics just in case? Don't take antibiotics when you don't need them. If you do that, they may not work when you do need them. Each time you take antibiotics, you are more likely to have some bacteria that survive and aren't killed by the medicine. Bacteria that don't die can change and become even harder to kill. These are called antibiotic-resistant bacteria. They can cause longer and more serious infections. To treat them, you may need different, stronger antibiotics that have more side effects and may cost more. So always ask your doctor if antibiotics are the best treatment. Explain that you do not want antibiotics unless you need them. Help protect the community  Using antibiotics when they're not needed leads to the development of antibiotic-resistant bacteria. These tougher bacteria can spread to family members, children, and coworkers. People in your community will have a risk of getting an infection that is harder to cure and that costs more to treat.   How can you take antibiotics safely? Be safe with medicine. Take your antibiotics as directed. Do not stop taking them just because you feel better. You need to take the full course of medicine. This will help make sure your infection is cured. It will also help prevent the growth of antibiotic-resistant bacteria. Always take the exact amount that the label says to take. If the label says to take the medicine at a certain time, follow those directions. You might feel better after you take an antibiotic for a few days. But it is important to keep taking it for as long as prescribed. That will help you get rid of those bacteria that are a bit stronger and that survive the first few days of treatment. Where can you learn more? Go to https://[employer]. org and sign in to your [employer] account. Enter H353 in the [employer] box to learn more about \"Learning About the Safe Use of Antibiotics. \"     If you do not have an account, please click on the \"Sign Up Now\" link. Current as of: March 3, 2017  Content Version: 11.3  © 6245-3289 [employer]. Care instructions adapted under license by [facility] ([facility]). If you have questions about a medical condition or this instruction, always ask your healthcare professional. [place] [address] any warranty or liability for your use of this information. Antibiotics are powerful drugs that are generally safe and very helpful in fighting disease, but there are times when antibiotics can actually be harmful. Antibiotics can have side effects, including allergic reactions and a potentially deadly diarrhea caused by the bacteria Clostridium difficile (C. diff). Antibiotics can also interfere with the action of other drugs a patient may be taking for another condition. These unintended reactions to antibiotics are called adverse drug events.    When someone takes an antibiotic that they do not need, they are needlessly exposed to the side about drug interactions and the potential side effects of antibiotics.  The doctor should be told immediately if a patient has any side effects from antibiotics  Page last updated: February 24, 2017 Content source:   Centers for Disease Control and [employer] for Emerging and Zoonotic Infectious Diseases ([place]aisa [place])  Division of Healthcare Quality Promotion [facility], [place])

## 2021-04-03 NOTE — FLOWSHEET NOTE
Patient + family members present. Patient was passive, states no needs prayers at this time.    shared in presence,  leaves note for possible follow up.     04/03/21 1501   Encounter Summary   Services provided to: Patient and family together   Referral/Consult From: [address] [facility] Family members   Continue Visiting   (4-3-21)   Complexity of Encounter Low   Length of Encounter 15 minutes   Spiritual Assessment Completed Yes   Routine   Type Initial   Assessment Passive   Intervention Explored feelings, thoughts, concerns   Outcome Refused/declined

## 2021-04-03 NOTE — PROGRESS NOTES
Patient admitted as a direct admit to room 2018 by wheelchair. Vitals and general assessment completed as charted. Patient oriented to room, call light and bed controls. Call light placed within reach, side rails up x 2, and bed in lowest position. Patient's cane from home placed near bed and patient encouraged to call for assistance getting out of bed. Patient verbalized understanding. Will continue to monitor.

## 2021-04-03 NOTE — CONSULTS
Consultation Note  Podiatric Medicine and Surgery     Subjective     Chief Complaint: Right leg pain and swelling    HPI:  Chey Palmer is a 60 y.o. female seen at [facility] for pain and swelling in her right leg and ankle. The patient states the symptoms began about 2 weeks ago making it difficult to ambulate. She denies any trauma. She states she went to [facility] where they performed a \"scan to look for blood clots,\" but did not find any. She states she has continued to have pain and swelling in the right leg and her PCP sent her to the hospital for admission. She denies any nausea, vomiting, fever, or chills. She follows with Dr. Sumi Cutler, having a gastroc recession, arthroeresis, PT debridement with FDL transfer on the right in 2015. She is diabetic with reported peripheral neuropathy. She has not other pedal complaints at this time. PCP is Pavel Small MD    ROS:   Review of Systems   Constitutional: Negative for chills and fever. Respiratory: Positive for shortness of breath (Baseline). Cardiovascular: Positive for chest pain (Baseline) and leg swelling. Gastrointestinal: Negative for diarrhea, nausea and vomiting. Musculoskeletal: Positive for gait problem and joint swelling. Skin: Positive for color change. Negative for wound. Neurological: Positive for numbness (Peripheral neuropathy). Psychiatric/Behavioral: Negative for agitation. Past Medical History   has a past medical history of Asthma, Bronchitis, CHF (congestive heart failure) ([address] COPD (chronic obstructive pulmonary disease) ([facility] [address] Diabetes mellitus ([facility] [address] H/O being hospitalized, H/O cardiac catheterization, Hypertension, LBBB (left bundle branch block), On home oxygen therapy, CECIL on CPAP, Pneumonia, SOB (shortness of breath), and Wears glasses. Past Surgical History   has a past surgical history that includes  section; Tonsillectomy; and Leg Tendon Surgery (Right, 2015).     Medications  Prior to Admission medications    Medication Sig Start Date End Date Taking? Authorizing Provider   oxyCODONE-acetaminophen (PERCOCET) 5-325 MG per tablet Take 1 tablet by mouth every 8 hours as needed for Pain.    Yes Historical Provider, MD   insulin glargine (LANTUS) 100 UNIT/ML injection vial Inject 20 Units into the skin every morning (before breakfast)     Historical Provider, MD   Liraglutide (VICTOZA) 18 MG/3ML SOPN SC injection Inject 1.2 mg into the skin daily    Historical Provider, MD   ipratropium-albuterol (DUONEB) 0.5-2.5 (3) MG/3ML SOLN nebulizer solution Inhale 3 mLs into the lungs every 6 hours as needed for Shortness of Breath 9/2/15   Travis Dyer MD   metoprolol (TOPROL-XL) 50 MG XL tablet Take 50 mg by mouth daily    Historical Provider, MD   polyethylene glycol (MIRALAX) PACK packet Take 17 g by mouth daily    Historical Provider, MD   lisinopril (PRINIVIL;ZESTRIL) 20 MG tablet Take 20 mg by mouth 2 times daily    Historical Provider, MD    Scheduled Meds:   vancomycin  2,500 mg Intravenous Once    Followed by   [facility] vancomycin  1,500 mg Intravenous Q12H    vancomycin (VANCOCIN) intermittent dosing (placeholder)   Other RX Placeholder    sodium chloride flush  10 mL Intravenous 2 times per day    enoxaparin  40 mg Subcutaneous Daily    famotidine  20 mg Oral BID    insulin glargine  20 Units Subcutaneous QAM AC    Liraglutide  1.2 mg Subcutaneous Daily    lisinopril  20 mg Oral BID    metoprolol succinate  50 mg Oral Daily    polyethylene glycol  17 g Oral Daily    insulin lispro  0-18 Units Subcutaneous TID WC    insulin lispro  0-9 Units Subcutaneous Nightly     Continuous Infusions:   sodium chloride      dextrose       PRN Meds:.sodium chloride flush, sodium chloride, potassium chloride **OR** potassium alternative oral replacement **OR** potassium chloride, magnesium sulfate, promethazine **OR** ondansetron, magnesium hydroxide, nicotine, acetaminophen **OR** acetaminophen, glucose, the digits. Moderate nonpitting edema to bilateral lower extremities, right worse than left. Neuro: Saph/sural/SP/DP/plantar sensation diminished to light touch. Musculoskeletal: Muscle strength is 4/5 to all lower extremity muscle groups on the right secondary to pain. Pain with active and passive dorsiflexion of the right ankle. Pain on palpation along the ankle joint and up the posterior calf. Gross deformity is absent. Dermatologic: No open lesions present. Erythema and edema noted to right lower leg. Induration noted to posterior leg. Induration noted to posterior calf. No fluctuance or crepitus. Interdigital maceration absent. Clinical Images:  None    Imaging:   XR CHEST PORTABLE   Final Result   Mild pulmonary edema. Mild cardiomegaly. XR ANKLE RIGHT (MIN 3 VIEWS)    (Results Pending)   US DUP LOWER EXTREMITY RIGHT CHRIS    (Results Pending)       Cultures: None    Assessment     Nelida Quezada is a 60 y.o. female with   Right lower extremity cellulitis vs DVT  DM type II with peripheral neuropathy  Morbid obesity   Pes planus s/p arthroeresis, PT debridement with FDL tx (2015)    Active Problems:    Cellulitis of right leg  Resolved Problems:    * No resolved hospital problems.  *        Plan     Patient examined and evaluated at bedside   Treatment options discussed in detail with the patient  Right ankle x-rays ordered  STAT venous US ordered to rule out DVT  IV abx: Vanco per primary  NWB to Right lower extremity  Discussed with Dr. Kenia Jimenez DPM   Podiatric Medicine & Surgery   4/3/2021 at 1:15 AM

## 2021-04-03 NOTE — PROGRESS NOTES
Physical Therapy    Facility/Department: [facility]  Initial Assessment    NAME: Jasiel Canada  : 1965  MRN: [id number]    Date of Service: 4/3/2021    Discharge Recommendations:  24 hour supervision or assist, Home with Home health PT(Home /  assist)        Assessment   Body structures, Functions, Activity limitations: Decreased endurance;Decreased balance  Prognosis: Good  Decision Making: High Complexity  PT Education: PT Role;Plan of Care;Gait Training  Patient Education: Handouts:NWB R LE ambulation w/ RW  REQUIRES PT FOLLOW UP: Yes  Activity Tolerance  Activity Tolerance: Patient Tolerated treatment well;Patient limited by endurance       Patient Diagnosis(es): There were no encounter diagnoses. has a past medical history of Asthma, Bronchitis, CHF (congestive heart failure) ([facility] [address] COPD (chronic obstructive pulmonary disease) ([facility] [address] Diabetes mellitus ([facility] [address] H/O being hospitalized, H/O cardiac catheterization, Hypertension, LBBB (left bundle branch block), On home oxygen therapy, CECIL on CPAP, Pneumonia, SOB (shortness of breath), and Wears glasses. has a past surgical history that includes  section; Tonsillectomy; and Leg Tendon Surgery (Right, 2015).     Restrictions  Restrictions/Precautions  Restrictions/Precautions: Weight Bearing, General Precautions  Lower Extremity Weight Bearing Restrictions  Right Lower Extremity Weight Bearing: Non Weight Bearing  Vision/Hearing  Vision: Impaired  Vision Exceptions: Wears glasses at all times  Hearing: Within functional limits     Subjective  General  Chart Reviewed: Yes  Patient assessed for rehabilitation services?: Yes  Response To Previous Treatment: Not applicable  Family / Caregiver Present: Yes(Daughter)  Follows Commands: Within Functional Limits  General Comment  Comments: OK for PT per Monet Pretty RN  Pain Screening  Patient Currently in Pain: Denies  Vital Signs  Patient Currently in Pain: Denies Orientation  Orientation  Overall Orientation Status: Within Normal Limits  Social/Functional History  Social/Functional History  Lives With: Daughter  Type of Home: House  Home Layout: One level  Home Access: Stairs to enter with rails  Entrance Stairs - Number of Steps: 2  Entrance Stairs - Rails: Right  Bathroom Shower/Tub: Tub/Shower unit  Bathroom Accessibility: Accessible  Home Equipment: Cane, Electric scooter, Oxygen(LBQC, O2 prn 3L)  ADL Assistance: Independent  Homemaking Assistance: Needs assistance  Meal Prep: Total  Laundry: Maximal  Vacuuming: Maximal  Cleaning: Maximal  Gardening: Total  Yard Work: Total  Driving: Total  Shopping:  Total  Cognition   Cognition  Overall Cognitive Status: WNL    Objective     Observation/Palpation  Posture: Good    AROM RLE (degrees)  RLE General AROM: NA  AROM LLE (degrees)  LLE AROM : WNL  AROM RUE (degrees)  RUE AROM : WNL  AROM LUE (degrees)  LUE AROM : WNL  Strength RLE  Comment: NA  Strength LLE  Strength LLE: WFL  Comment: 5/5 L LE  Strength RUE  Strength RUE: WFL  Comment: B UE's 4+/5 to 5/5  Strength LUE  Strength LUE: WFL  Tone RLE  RLE Tone: Normotonic  Tone LLE  LLE Tone: Normotonic  Motor Control  Gross Motor?: WNL  Sensation  Overall Sensation Status: WNL  Bed mobility  Rolling to Right: Modified independent  Supine to Sit: Stand by assistance  Scooting: Stand by assistance  Transfers  Sit to Stand: Minimal Assistance  Stand to sit: Minimal Assistance  Stand Pivot Transfers: Minimal Assistance  Ambulation  Ambulation?: Yes  Ambulation 1  Surface: level tile  Device: Rolling Walker  Assistance: Minimal assistance  Quality of Gait: Pt has difficulty maintaining NWB R LE  Distance: 4 steps forward and back  Comments: To EOB, demonstrated proper sit<-->stand technique and gait pattern NWB R LE w/ RW  Stairs/Curb  Stairs?: No     Balance  Posture: Good  Sitting - Static: Good  Sitting - Dynamic: Good  Standing - Static: Fair;+  Standing - Dynamic: Fair Plan   Plan  Times per week: 1-2x/day,6-7 days/week  Current Treatment Recommendations: Transfer Training, Balance Training, Endurance Training, Gait Training, Stair training  Safety Devices  Type of devices: Left in bed, Bed alarm in place, Gait belt, Call light within reach(Per Shanice RN, no bed alarm required if family present, but family to notify RN when they leave so alarm can be activated, fanily notified of this)  Restraints  Initially in place: No  Pt need RW for home  G-Code       OutComes Score                                                  AM-PAC Score             Goals  Short term goals  Time Frame for Short term goals: 12 treatments  Short term goal 1: Independent transfers/bed mobility  Short term goal 2:  Independent ambulation w/ RW NWB R LE 75' x 1  Short term goal 3: Good standing balance  Short term goal 4: Tolerate 30 min ther act  Short term goal 5: CGA ascending/descending 2 steps w B HR  Patient Goals   Patient goals : Home ASAP       Therapy Time   Individual Concurrent Group Co-treatment   Time In [address]         Time Out [address]         Minutes [address] Dexter Kasper

## 2021-04-03 NOTE — PROGRESS NOTES
Patient weight is 150 kg or greater. For prophylaxis with Enoxaparin, Pharmacy adjusted the dose to account for the patient's increased body weight in accordance with hospital approved protocol. The dose has been changed to 40 mg BID. Please contact pharmacy with any concerns @ [phone]. Thank you.    PALOMA CEBALLOS  4/3/2021 2:44 AM

## 2021-04-03 NOTE — CONSULTS
Pharmacy Note  Vancomycin Consult - Initial Note     Nikko Bravo is a 60 y.o. female ordered Vancomycin. .  Current diagnosis for which MRSA is suspected/confirmed: right leg cellulitis  Vancomycin order/consult received from Dr. Michael Stephens . Additional antimicrobials:  none    Patient Active Problem List   Diagnosis    Post-op pain    Pneumonia    Respiratory failure (HCC)    Altered mental status    CECIL (obstructive sleep apnea)    Diabetes mellitus (HCC)    Acidosis    Hypoxia    Hyperthyroidism    FERCHO (acute kidney injury) ([facility] [address]    Obesity, morbid, BMI 50 or higher ([facility] [address]    Constipation    Cellulitis of right leg       Height:     Wt Readings from Last 1 Encounters:   04/02/21 (!) 376 lb (170.6 kg)     Allergies:  Ipratropium-albuterol, Morphine, and Metformin       No results found for: PROCAL  Recent Labs     04/02/21  2338   BUN 19     Recent Labs     04/02/21  2338   CREATININE 0.75     Recent Labs     04/02/21  2338   WBC 4.8     No intake or output data in the 24 hours ending 04/03/21 0039    Temp: 98.1 F    Culture Date / Garth Sin  /  Results  4/2/21   blood x2   in process    Actual Weight:    Wt Readings from Last 1 Encounters:   04/02/21 (!) 376 lb (170.6 kg)     CrCl based on IBW\"  70.6 ml/min        PLAN   Initial loading dose of 25mg/kg (max of 2500 mg) = 2500  mg   2. Vancomycin 1500 mg IV every 12 hours. 3.   Ensured BUN/sCr ordered at baseline and at least every 3rd day. 4.   ONLY for suspected pneumonia or COPD: MRSA nasal swab  is not ordered. Non respiratory infection. .    5. Trough ordered for: 4/4/21 @1200 . Trough Goals (Non-dialysis patient) Peaks are not routinely recommended. 10-20 mcg/mL for mild skin/soft tissue infections or UTI.  15-20 mcg/mL is the target trough for all other indications that MRSA infection is suspected. TROUGH TIMING: (Additional levels drawn based on renal function and/or clinical response).   Dosing interval Timing of Trough   Every 8 hr, 12 hr, or 18 hr regimen Prior to the 4th dose  Twice weekly troughs for every 8 hour dosing   Every 24 hr regimen Prior to the 3rd or 4th dose   Every 36 hr regimen Prior to the 3rd dose           Thank you for the consult. Pharmacy will continue to follow.   PALOMA CEBALLOS RPh/PharmD  4/3/2021  12:39 AM

## 2021-04-03 NOTE — CONSULTS
General: She is not in acute distress. Appearance: Normal appearance. HENT:      Right Ear: External ear normal.      Left Ear: External ear normal.      Mouth/Throat:      Pharynx: Oropharynx is clear. No posterior oropharyngeal erythema. Eyes:      General: No scleral icterus. Conjunctiva/sclera: Conjunctivae normal.   Neck:      Musculoskeletal: Neck supple. No neck rigidity. Cardiovascular:      Rate and Rhythm: Normal rate and regular rhythm. Heart sounds: No murmur. Pulmonary:      Effort: Pulmonary effort is normal.      Breath sounds: No wheezing. Abdominal:      General: Abdomen is flat. There is no distension. Palpations: Abdomen is soft. Musculoskeletal:      Right lower leg: Edema present. Left lower leg: Edema present. Comments: Right ankle warmth, mild erythema and swelling extend to the foot dorsum and distal leg, no open wound, old scar. Skin:     Coloration: Skin is not jaundiced. Findings: No bruising. Neurological:      General: No focal deficit present. Mental Status: She is alert and oriented to person, place, and time. Psychiatric:         Mood and Affect: Mood normal.         Behavior: Behavior normal.         Past Medical History:     Past Medical History:   Diagnosis Date    Asthma     Bronchitis     hx of    CHF (congestive heart failure) (HCC)     COPD (chronic obstructive pulmonary disease) ([facility] [address]     Diabetes mellitus ([facility] [address]     H/O being hospitalized 09/2017    for approx.  1 week with asthma.    H/O cardiac catheterization 10/2014    no stents OP at [address] Last visit 5/2015     Hypertension     LBBB (left bundle branch block) 09/20/2017    On home oxygen therapy     3 liters hooked into the CPAP machine    CECIL on CPAP     uses nightly    Pneumonia     SOB (shortness of breath)     HX OF    Wears glasses        Past Surgical  History:     Past Surgical History:   Procedure Laterality Date     SECTION      x4    LEG TENDON SURGERY Right 2015    was' in ICU AFTER SURGERY FOR FEW DAYS AFTER DISCHARGED FROM [facility] , TO EXTENDED CARE FACILITY DUE TO COMPLICATIONS\"    TONSILLECTOMY         Medications:      vancomycin  1,500 mg Intravenous Q12H    vancomycin (VANCOCIN) intermittent dosing (placeholder)   Other RX Placeholder    enoxaparin  40 mg Subcutaneous BID    sodium chloride flush  10 mL Intravenous 2 times per day    famotidine  20 mg Oral BID    insulin glargine  20 Units Subcutaneous QAM AC    Liraglutide  1.2 mg Subcutaneous Daily    lisinopril  20 mg Oral BID    metoprolol succinate  50 mg Oral Daily    polyethylene glycol  17 g Oral Daily    insulin lispro  0-18 Units Subcutaneous TID WC    insulin lispro  0-9 Units Subcutaneous Nightly       Social History:     Social History     Socioeconomic History    Marital status: Legally      Spouse name: Not on file    Number of children: 4    Years of education: Not on file    Highest education level: Not on file   Occupational History    Not on file   Social Needs    Financial resource strain: Not on file    Food insecurity     Worry: Not on file     Inability: Not on file   [employer] needs     Medical: Not on file     Non-medical: Not on file   Tobacco Use    Smoking status: Current Every Day Smoker     Packs/day: 0.50     Years: 20.00     Pack years: 10.00    Smokeless tobacco: Never Used   Substance and Sexual Activity    Alcohol use: No    Drug use: No    Sexual activity: Not on file   Lifestyle    Physical activity     Days per week: Not on file     Minutes per session: Not on file    Stress: Not on file   Relationships    Social connections     Talks on phone: Not on file     Gets together: Not on file     Attends Gnosticism service: Not on file     Active member of club or organization: Not on file     Attends meetings of clubs or organizations: Not on file     Relationship status:

## 2021-04-03 NOTE — H&P
History & Physical  [facility]    Adult Hospitalist      Name: Marco Pugh  MRN: [id number]     Acct: [de-identified]  Room: 2018/2018-02    Admit Date: 4/2/2021  8:18 PM  PCP: Antolin Dial MD    Primary Problem  Active Problems:    Cellulitis of right leg  Resolved Problems:    * No resolved hospital problems.  *        Assesment:   Right lower extremity cellulitis  Suspected DVT  History of right lower extremity surgery with hardware in place in the past  Diabetes mellitus type 2  Peripheral neuropathy  Essential hypertension  Hyperlipidemia  Obstructive sleep apnea  Morbid obesity, BMI 64.69  History of gout        Plan:   Admit patient to MedSurg unit  Oxygen to keep SPO2 more than 90%  Telemetry  Check vital signs closely  CBC BMP daily  Blood cultures  Reviewed x-ray shows postsurgical changes and soft tissue edema  Patient likely will need MRI of the foot/ankle  IV vancomycin  IV cefepime  Lovenox for DVT prophylaxis  Lower extremity Doppler is pending  Continue Lantus, insulin sliding scale and close monitoring of blood sugar  Continue lisinopril and Coreg  Infectious disease on consult  Podiatry on consult  Continue other medication as below      Scheduled Meds:   vancomycin  1,500 mg Intravenous Q12H    vancomycin (VANCOCIN) intermittent dosing (placeholder)   Other RX Placeholder    enoxaparin  40 mg Subcutaneous BID    cefepime  2,000 mg Intravenous Q12H    allopurinol  100 mg Oral Daily    carvedilol  12.5 mg Oral BID WC    colchicine  0.6 mg Oral Daily    furosemide  20 mg Oral BID    pioglitazone  15 mg Oral Daily    rosuvastatin  40 mg Oral Daily    sodium chloride flush  10 mL Intravenous 2 times per day    famotidine  20 mg Oral BID    insulin glargine  20 Units Subcutaneous QAM AC    Liraglutide  1.2 mg Subcutaneous Daily    lisinopril  20 mg Oral BID    polyethylene glycol  17 g Oral Daily    insulin lispro  0-18 Units Subcutaneous TID WC    insulin lispro  0-9 Units Subcutaneous Nightly     Continuous Infusions:   sodium chloride      dextrose       PRN Meds:  oxyCODONE-acetaminophen, 1 tablet, Q8H PRN  sodium chloride flush, 10 mL, PRN  sodium chloride, 25 mL, PRN  potassium chloride, 40 mEq, PRN    Or  potassium alternative oral replacement, 40 mEq, PRN    Or  potassium chloride, 10 mEq, PRN  magnesium sulfate, 1,000 mg, PRN  promethazine, 12.5 mg, Q6H PRN    Or  ondansetron, 4 mg, Q6H PRN  magnesium hydroxide, 30 mL, Daily PRN  nicotine, 1 patch, Daily PRN  acetaminophen, 650 mg, Q6H PRN    Or  acetaminophen, 650 mg, Q6H PRN  glucose, 15 g, PRN  dextrose, 12.5 g, PRN  glucagon (rDNA), 1 mg, PRN  dextrose, 100 mL/hr, PRN  HYDROcodone-acetaminophen, 2 tablet, Q4H PRN        Chief Complaint:     Right leg cellulitis    History of Present Illness:      Harry Acevedo is a 60 y.o.  female who presents with No chief complaint on file. This is a 49-year-old female has been admitted by her PCP Dr. Ember Woodard, directly to our service, patient has seen him as an outpatient for right lower extremity cellulitis, patient is status post right ankle surgery in the past, and has hardware in place, patient has been having issues intermittently with right lower extremity, PCP noticed that patient has more swelling and redness in the lower extremity and patient has past medical history significant for diabetes this is and there is a concern for infection, patient was admitted for further management    I have personally reviewed the past medical history, past surgical history, medications, social history, and family history, and summarized in the note. Review of Systems:     All 10 point system is reviewed and negative otherwise mentioned in HPI.       Past Medical History:     Past Medical History:   Diagnosis Date    Asthma     Bronchitis     hx of    CHF (congestive heart failure) (HCC)     [facility]     [facility]     H/O being hospitalized 2017    for approx. 1 week with asthma.    H/O cardiac catheterization 10/2014    no stents OP at [address] Last visit 2015     Hypertension     LBBB (left bundle branch block) 2017    On home oxygen therapy     3 liters hooked into the CPAP machine    CECIL on CPAP     uses nightly    Pneumonia     SOB (shortness of breath)     HX OF    Wears glasses         Past Surgical History:     Past Surgical History:   Procedure Laterality Date     SECTION      x4    LEG TENDON SURGERY Right 2015    [facility] [place] [facility] DUE TO COMPLICATIONS\"    TONSILLECTOMY          Medications Prior to Admission:       Prior to Admission medications    Medication Sig Start Date End Date Taking? Authorizing Provider   pioglitazone (ACTOS) 30 MG tablet Take 15 mg by mouth daily   Yes Historical Provider, MD   carvedilol (COREG) 12.5 MG tablet Take 12.5 mg by mouth 2 times daily (with meals)   Yes Historical Provider, MD   allopurinol (ZYLOPRIM) 100 MG tablet Take 100 mg by mouth daily   Yes Historical Provider, MD   colchicine (COLCRYS) 0.6 MG tablet Take 0.6 mg by mouth daily   Yes Historical Provider, MD   furosemide (LASIX) 20 MG tablet Take 20 mg by mouth 2 times daily   Yes Historical Provider, MD   rosuvastatin (CRESTOR) 40 MG tablet Take 40 mg by mouth daily   Yes Historical Provider, MD   oxyCODONE-acetaminophen (PERCOCET) 5-325 MG per tablet Take 1 tablet by mouth every 8 hours as needed for Pain.    Yes Historical Provider, MD   insulin glargine (LANTUS) 100 UNIT/ML injection vial Inject 20 Units into the skin every morning (before breakfast)     Historical Provider, MD   Liraglutide (VICTOZA) 18 MG/3ML SOPN SC injection Inject 1.2 mg into the skin daily    Historical Provider, MD   ipratropium-albuterol (DUONEB) 0.5-2.5 (3) MG/3ML SOLN nebulizer solution Inhale 3 mLs into the lungs every 6 hours as needed for Shortness of Breath 9/2/15   Travis Dyer MD   polyethylene glycol (MIRALAX) PACK packet Take 17 g by mouth daily    Historical Provider, MD   lisinopril (PRINIVIL;ZESTRIL) 20 MG tablet Take 20 mg by mouth 2 times daily    Historical Provider, MD        Allergies:       Ipratropium-albuterol, Morphine, and Metformin    Social History:     Tobacco:    reports that she has been smoking. She has a 10.00 pack-year smoking history. She has never used smokeless tobacco.  Alcohol:      reports no history of alcohol use. Drug Use:  reports no history of drug use.     Family History:     Family History   Problem Relation Age of Onset    Diabetes Mother     Hypertension Mother     Kidney Disease Mother     Asthma Father          Physical Exam:     Vitals:  BP (!) 152/70   Pulse 84   Temp 97.5 °F (36.4 °C) (Oral)   Resp 16   Ht 5' 4\" (1.626 m)   Wt (!) 376 lb 14.4 oz (171 kg)   LMP 07/10/2017   SpO2 91%   BMI 64.69 kg/m²   Temp (24hrs), Av.8 °F (36.6 °C), Min:97.5 °F (36.4 °C), Max:98.1 °F (36.7 °C)          General appearance - alert, well appearing, and in no acute distress  Mental status - oriented to person, place, and time with normal affect  Head - normocephalic and atraumatic  Eyes - pupils equal and reactive, extraocular eye movements intact, conjunctiva clear  Ears - hearing appears to be intact  Nose - no drainage noted  Mouth - mucous membranes moist  Neck - supple, no carotid bruits, thyroid not palpable  Chest - clear to auscultation, normal effort  Heart - normal rate, regular rhythm, no murmur  Abdomen - soft, nontender, nondistended, bowel sounds present all four quadrants, no masses, hepatomegaly or splenomegaly  Neurological - normal speech, no focal findings or movement disorder noted, cranial nerves II through XII grossly intact  Extremities - peripheral pulses palpable, right lower extremity swelling and redness  Skin - no gross lesions, rashes, or induration noted        Data:     Labs:    Hematology:  Recent Labs     04/02/21 2338 04/03/21  0646   WBC 4.8 4.8   RBC 4.72 4.54   HGB 13.3 13.1   HCT 44.9 43.8   MCV 95.1 96.5   MCH 28.2 28.9   MCHC 29.6 29.9   RDW 14.2 14.0    229   MPV 10.4 10.4   SEDRATE 85*  --    INR  --  1.1     Chemistry:  Recent Labs     04/02/21  2338 04/03/21  0646    139   K 4.1 4.2    101   CO2 27 29   GLUCOSE 106* 131*   BUN 19 17   CREATININE 0.75 0.77   ANIONGAP 13 9   LABGLOM >60 >60   GFRAA >60 >60   CALCIUM 9.2 8.9     Recent Labs     04/02/21  2241 04/03/21  0613 04/03/21  1234 04/03/21  1559   POCGLU 92 116* 252* 106*       Lab Results   Component Value Date    INR 1.1 04/03/2021    PROTIME 14.0 04/03/2021       Lab Results   Component Value Date/Time    SPECIAL NOT REPORTED 04/02/2021 10:01 PM     Lab Results   Component Value Date/Time    CULTURE NO GROWTH 16 HOURS 04/02/2021 10:01 PM       Lab Results   Component Value Date    PHART 7.290 08/29/2015    UAT3BWW 70.0 08/29/2015    PO2ART 51.0 08/29/2015    QVE3ADV 32.6 08/29/2015    NBEA NOT REPORTED 08/29/2015    PBEA 4.9 08/29/2015    I2YZICVI 89.0 08/29/2015    FIO2 BIPAP 08/29/2015       Radiology:    Xr Ankle Right (min 3 Views)    Result Date: 4/3/2021  1. Postsurgical changes within the subtalar joint and region of the medial navicular. 2. Moderate soft tissue edema and soft tissue swelling about the right lower leg, right ankle and right foot. 3. Degenerative changes and diffuse osteopenia as above. 4. No acute fracture or dislocation. Xr Chest Portable    Result Date: 4/2/2021  Mild pulmonary edema. Mild cardiomegaly.          All radiological studies reviewed                Code Status:  Full Code    Electronically signed by Mariana Nixon MD on 4/3/2021 at 4:59 PM     Copy sent to Dr. Jerry Samuel MD    This note was created with the assistance of a speech-recognition program.  Although the intention is to generate a document that actually reflects the content of the visit, no guarantees can be provided that every mistake has been identified and corrected by editing. Note was updated later by me after  physical examination and  completion of the assessment.

## 2021-04-03 NOTE — PROGRESS NOTES
Asthma, Bronchitis, CHF (congestive heart failure) ([facility] COPD (chronic obstructive pulmonary disease) ([facility] Diabetes mellitus ([facility] H/O being hospitalized, H/O cardiac catheterization, Hypertension, LBBB (left bundle branch block), On home oxygen therapy, CECIL on CPAP, Pneumonia, SOB (shortness of breath), and Wears glasses. has a past surgical history that includes  section; Tonsillectomy; and Leg Tendon Surgery (Right, 2015). Restrictions  Restrictions/Precautions  Restrictions/Precautions: General Precautions, Weight Bearing, Up as Tolerated  Lower Extremity Weight Bearing Restrictions  Right Lower Extremity Weight Bearing: Non Weight Bearing(Pending LE doppler results and MD to determine results of prior tests regarding any changes in WB on right LE)    Subjective   General  Chart Reviewed: Yes  Patient assessed for rehabilitation services?: Yes  Family / Caregiver Present: Yes  Subjective  Subjective: Pt reports she is awaiting MD to discuss test results on right LE and how long for NWB on right LE. Patient Currently in Pain: Yes  Pain Assessment  Pain Level: 6  Pain Type: Acute pain  Pain Location: Leg  Pain Orientation: Right; Lower  Pain Descriptors: Constant  Pain Frequency: Continuous  Clinical Progression: Not changed  Functional Pain Assessment: Prevents or interferes with many active not passive activities  Vital Signs  Temp: 97.6 °F (36.4 °C)  Temp Source: Oral  Pulse: 93  Heart Rate Source: Monitor  Resp: 16  BP: 134/70  BP Location: Right Arm  MAP (mmHg): 83  Level of Consciousness: Alert (0)  MEWS Score: 1  Patient Currently in Pain: Yes  Oxygen Therapy  SpO2: (!) 89 %  O2 Device: None (Room air)  Social/Functional History  Social/Functional History  Lives With: Daughter  Type of Home: House  Home Layout: One level  Home Access: Stairs to enter with rails  Entrance Stairs - Number of Steps: 2  Entrance Stairs - Rails: Right  Bathroom Shower/Tub: Tub/Shower unit  [place] Accessibility: Accessible  Home Equipment: Cane, Electric scooter, Oxygen(LBQC, O2 prn 3L)  ADL Assistance: Independent  Homemaking Assistance: Needs assistance  Meal Prep: Total  Laundry: Maximal  Vacuuming: Maximal  Cleaning: Maximal  Gardening: Total  Yard Work: Total  Driving: Total  Shopping: Total  Additional Comments: Pt reports was able to walk short distances, but increasing right LE pain over past 2 weeks have affected. Objective   Vision: Impaired  Vision Exceptions: Wears glasses at all times  Hearing: Within functional limits    Orientation  Overall Orientation Status: Within Functional Limits  Observation/Palpation  Posture: Good  Balance  Sitting Balance: Independent  ADL  Feeding: Independent  Grooming: Setup  UE Bathing: Setup  LE Bathing: Moderate assistance  UE Dressing: Setup  LE Dressing: Moderate assistance  Tone RUE  RUE Tone: Normotonic  Tone LUE  LUE Tone: Normotonic  Coordination  Movements Are Fluid And Coordinated: Yes     Bed mobility  Supine to Sit: Stand by assistance  Sit to Supine: Stand by assistance  Scooting: Stand by assistance  Transfers  Sit to stand: Minimal assistance  Stand to sit: Minimal assistance  Transfer Comments: Pt uanble to advance with maintaining NWB right LE     Cognition  Overall Cognitive Status: WNL        Sensation  Overall Sensation Status: WNL        LUE AROM (degrees)  LUE AROM : WNL  Left Hand AROM (degrees)  Left Hand AROM: WNL  RUE AROM (degrees)  RUE AROM : WNL  Right Hand AROM (degrees)  Right Hand AROM: WNL  LUE Strength  Gross LUE Strength: WNL  RUE Strength  Gross RUE Strength:  WNL                   Plan   Plan  Times per week: 5-6x  Times per day: Daily  Plan weeks: 1 week  Current Treatment Recommendations: Patient/Caregiver Education & Training, Balance Training, Functional Mobility Training, Endurance Training, Safety Education & Training, Self-Care / ADL    G-Code     OutComes Score                                                  AM-PAC Score        -[facility] Inpatient Daily Activity Raw Score: 17 (04/03/21 1246)  AM-PAC Inpatient ADL T-Scale Score : 37.26 (04/03/21 1246)  ADL Inpatient CMS 0-100% Score: 50.11 (04/03/21 1246)  ADL Inpatient CMS G-Code Modifier : CK (04/03/21 1246)    Goals  Short term goals  Time Frame for Short term goals: until d/c from facility  Short term goal 1: Further assess transfer pending any updates on right LE WB status. Short term goal 2: Pt to be Mod Indep with dressing using AD as needed. Short term goal 3: Pt to demo transfer to heavy duty commode and maintain NWB on right LE with SBA. Short term goal 4: Pt to acknowledge DME/AD to assist with NWB on right LE during ADL/transfers. Patient Goals   Patient goals : To return home. Therapy Time   Individual Concurrent Group Co-treatment   Time In 9220         Time Out 1215         Minutes 20           Upon writer exit, call light within reach, pt sitting edge of bed. All lines intact and patient positioned comfortably. All patient needs addressed prior to ending therapy session. Chart reviewed prior to treatment and patient is agreeable for therapy. RN reports patient is medically stable for therapy treatment this date.        Jennifer Bernal OT

## 2021-04-04 LAB
ABSOLUTE EOS #: 0.07 K/UL (ref 0–0.44)
ABSOLUTE IMMATURE GRANULOCYTE: 0.01 K/UL (ref 0–0.3)
ABSOLUTE LYMPH #: 1.76 K/UL (ref 1.1–3.7)
ABSOLUTE MONO #: 0.45 K/UL (ref 0.1–1.2)
ANION GAP SERPL CALCULATED.3IONS-SCNC: 11 MMOL/L (ref 9–17)
BASOPHILS # BLD: 1 % (ref 0–2)
BASOPHILS ABSOLUTE: 0.04 K/UL (ref 0–0.2)
BUN BLDV-MCNC: 16 MG/DL (ref 6–20)
BUN/CREAT BLD: 21 (ref 9–20)
C-REACTIVE PROTEIN: 4 MG/L (ref 0–5)
CALCIUM SERPL-MCNC: 9.1 MG/DL (ref 8.6–10.4)
CHLORIDE BLD-SCNC: 100 MMOL/L (ref 98–107)
CO2: 29 MMOL/L (ref 20–31)
CREAT SERPL-MCNC: 0.77 MG/DL (ref 0.5–0.9)
DIFFERENTIAL TYPE: NORMAL
EOSINOPHILS RELATIVE PERCENT: 2 % (ref 1–4)
GFR AFRICAN AMERICAN: >60 ML/MIN
GFR NON-AFRICAN AMERICAN: >60 ML/MIN
GFR SERPL CREATININE-BSD FRML MDRD: ABNORMAL ML/MIN/{1.73_M2}
GFR SERPL CREATININE-BSD FRML MDRD: ABNORMAL ML/MIN/{1.73_M2}
GLUCOSE BLD-MCNC: 104 MG/DL (ref 65–105)
GLUCOSE BLD-MCNC: 107 MG/DL (ref 65–105)
GLUCOSE BLD-MCNC: 110 MG/DL (ref 65–105)
GLUCOSE BLD-MCNC: 115 MG/DL (ref 70–99)
GLUCOSE BLD-MCNC: 96 MG/DL (ref 65–105)
HCT VFR BLD CALC: 43.9 % (ref 36.3–47.1)
HEMOGLOBIN: 13 G/DL (ref 11.9–15.1)
IMMATURE GRANULOCYTES: 0 %
LYMPHOCYTES # BLD: 38 % (ref 24–43)
MCH RBC QN AUTO: 28.7 PG (ref 25.2–33.5)
MCHC RBC AUTO-ENTMCNC: 29.6 G/DL (ref 28.4–34.8)
MCV RBC AUTO: 96.9 FL (ref 82.6–102.9)
MONOCYTES # BLD: 10 % (ref 3–12)
NRBC AUTOMATED: 0 PER 100 WBC
PDW BLD-RTO: 14.1 % (ref 11.8–14.4)
PLATELET # BLD: 234 K/UL (ref 138–453)
PLATELET ESTIMATE: NORMAL
PMV BLD AUTO: 10.5 FL (ref 8.1–13.5)
POTASSIUM SERPL-SCNC: 4.3 MMOL/L (ref 3.7–5.3)
RBC # BLD: 4.53 M/UL (ref 3.95–5.11)
RBC # BLD: NORMAL 10*6/UL
SEDIMENTATION RATE, ERYTHROCYTE: 103 MM (ref 0–30)
SEG NEUTROPHILS: 49 % (ref 36–65)
SEGMENTED NEUTROPHILS ABSOLUTE COUNT: 2.34 K/UL (ref 1.5–8.1)
SODIUM BLD-SCNC: 140 MMOL/L (ref 135–144)
URIC ACID: 5.1 MG/DL (ref 2.4–5.7)
VANCOMYCIN TROUGH DATE LAST DOSE: ABNORMAL
VANCOMYCIN TROUGH DOSE AMOUNT: ABNORMAL
VANCOMYCIN TROUGH TIME LAST DOSE: ABNORMAL
VANCOMYCIN TROUGH: 20.7 UG/ML (ref 10–20)
WBC # BLD: 4.7 K/UL (ref 3.5–11.3)
WBC # BLD: NORMAL 10*3/UL

## 2021-04-04 PROCEDURE — 6360000002 HC RX W HCPCS: Performed by: INTERNAL MEDICINE

## 2021-04-04 PROCEDURE — 6370000000 HC RX 637 (ALT 250 FOR IP): Performed by: FAMILY MEDICINE

## 2021-04-04 PROCEDURE — 1200000000 HC SEMI PRIVATE

## 2021-04-04 PROCEDURE — 85025 COMPLETE CBC W/AUTO DIFF WBC: CPT

## 2021-04-04 PROCEDURE — 80202 ASSAY OF VANCOMYCIN: CPT

## 2021-04-04 PROCEDURE — 36415 COLL VENOUS BLD VENIPUNCTURE: CPT

## 2021-04-04 PROCEDURE — 2580000003 HC RX 258: Performed by: FAMILY MEDICINE

## 2021-04-04 PROCEDURE — 6360000002 HC RX W HCPCS: Performed by: FAMILY MEDICINE

## 2021-04-04 PROCEDURE — 99232 SBSQ HOSP IP/OBS MODERATE 35: CPT | Performed by: INTERNAL MEDICINE

## 2021-04-04 PROCEDURE — 2580000003 HC RX 258: Performed by: INTERNAL MEDICINE

## 2021-04-04 PROCEDURE — 85652 RBC SED RATE AUTOMATED: CPT

## 2021-04-04 PROCEDURE — 82947 ASSAY GLUCOSE BLOOD QUANT: CPT

## 2021-04-04 PROCEDURE — 86140 C-REACTIVE PROTEIN: CPT

## 2021-04-04 PROCEDURE — 80048 BASIC METABOLIC PNL TOTAL CA: CPT

## 2021-04-04 PROCEDURE — 84550 ASSAY OF BLOOD/URIC ACID: CPT

## 2021-04-04 RX ADMIN — SODIUM CHLORIDE, PRESERVATIVE FREE 10 ML: 5 INJECTION INTRAVENOUS at 08:06

## 2021-04-04 RX ADMIN — CARVEDILOL 12.5 MG: 12.5 TABLET, FILM COATED ORAL at 16:18

## 2021-04-04 RX ADMIN — CEFEPIME HYDROCHLORIDE 2000 MG: 2 INJECTION, POWDER, FOR SOLUTION INTRAVENOUS at 16:18

## 2021-04-04 RX ADMIN — FAMOTIDINE 20 MG: 20 TABLET ORAL at 19:24

## 2021-04-04 RX ADMIN — FAMOTIDINE 20 MG: 20 TABLET ORAL at 08:06

## 2021-04-04 RX ADMIN — VANCOMYCIN HYDROCHLORIDE 1500 MG: 5 INJECTION, POWDER, LYOPHILIZED, FOR SOLUTION INTRAVENOUS at 01:43

## 2021-04-04 RX ADMIN — ENOXAPARIN SODIUM 40 MG: 40 INJECTION SUBCUTANEOUS at 19:24

## 2021-04-04 RX ADMIN — FUROSEMIDE 20 MG: 20 TABLET ORAL at 16:18

## 2021-04-04 RX ADMIN — CARVEDILOL 12.5 MG: 12.5 TABLET, FILM COATED ORAL at 08:05

## 2021-04-04 RX ADMIN — MAGNESIUM HYDROXIDE 30 ML: 400 SUSPENSION ORAL at 14:34

## 2021-04-04 RX ADMIN — ROSUVASTATIN CALCIUM 40 MG: 40 TABLET, FILM COATED ORAL at 19:24

## 2021-04-04 RX ADMIN — COLCHICINE 0.6 MG: 0.6 TABLET ORAL at 08:06

## 2021-04-04 RX ADMIN — LISINOPRIL 20 MG: 20 TABLET ORAL at 08:05

## 2021-04-04 RX ADMIN — VANCOMYCIN HYDROCHLORIDE 1500 MG: 5 INJECTION, POWDER, LYOPHILIZED, FOR SOLUTION INTRAVENOUS at 14:27

## 2021-04-04 RX ADMIN — PIOGLITAZONE 15 MG: 15 TABLET ORAL at 08:06

## 2021-04-04 RX ADMIN — ALLOPURINOL 100 MG: 100 TABLET ORAL at 08:05

## 2021-04-04 RX ADMIN — FUROSEMIDE 20 MG: 20 TABLET ORAL at 08:06

## 2021-04-04 RX ADMIN — ENOXAPARIN SODIUM 40 MG: 40 INJECTION SUBCUTANEOUS at 08:06

## 2021-04-04 RX ADMIN — LISINOPRIL 20 MG: 20 TABLET ORAL at 19:25

## 2021-04-04 RX ADMIN — CEFEPIME HYDROCHLORIDE 2000 MG: 2 INJECTION, POWDER, FOR SOLUTION INTRAVENOUS at 04:24

## 2021-04-04 RX ADMIN — INSULIN GLARGINE 20 UNITS: 100 INJECTION, SOLUTION SUBCUTANEOUS at 08:07

## 2021-04-04 RX ADMIN — SODIUM CHLORIDE, PRESERVATIVE FREE 10 ML: 5 INJECTION INTRAVENOUS at 14:27

## 2021-04-04 RX ADMIN — POLYETHYLENE GLYCOL 3350 17 G: 17 POWDER, FOR SOLUTION ORAL at 08:06

## 2021-04-04 ASSESSMENT — PAIN SCALES - GENERAL: PAINLEVEL_OUTOF10: 4

## 2021-04-04 NOTE — PROGRESS NOTES
101 100   CO2 27 29 29   BUN 19 17 16   CREATININE 0.75 0.77 0.77   GLUCOSE 106* 131* 115*   CALCIUM 9.2 8.9 9.1        Coags:  Recent Labs     21  0646   INR 1.1       Lab Results   Component Value Date    SEDRATE 85 (H) 2021     Recent Labs     21  2338   CRP <3.0     Lower Extremity Physical Exam:  Vascular: DP and PT pulses are non palpable, but audible on doppler. CFT <3 seconds to all digits. Hair growth is absent to the level of the digits. Moderate nonpitting edema to bilateral lower extremities, right worse than left. Mild increase in warmth to the posterior calf of right leg.     Neuro: Saph/sural/SP/DP/plantar sensation diminished to light touch.     Musculoskeletal: Muscle strength is 5/5 to plantarflexion of right foot, reports no pain. some pain to inversion, eversion or dorsiflexion; pain located to superior calf. Pain on palpation of superior calf medial and lateral. Gross deformity is absent.     Dermatologic: No open lesions present. Darkened skin present to medial and lateral calf. No pain to palpation to the indurated areas. No fluctuance or crepitus. Interdigital maceration absent. Clinical Images:          Imaging:   VL Lower Extremity Venous Right   Final Result      XR ANKLE RIGHT (MIN 3 VIEWS)   Final Result   1. Postsurgical changes within the subtalar joint and region of the medial   navicular. 2. Moderate soft tissue edema and soft tissue swelling about the right lower   leg, right ankle and right foot. 3. Degenerative changes and diffuse osteopenia as above. 4. No acute fracture or dislocation. XR CHEST PORTABLE   Final Result   Mild pulmonary edema. Mild cardiomegaly. Cultures: none     Assessment   Melida  is a 60 y.o. female with   1. Right lower extremity cellulitis  2. DM type II with peripheral neuropathy  3. Morbid obesity   4.  Pes planus s/p arthroeresis, PT debridement with FDL tx ()    Active Problems: Cellulitis of right leg  Resolved Problems:    * No resolved hospital problems. *       Plan     · Patient examined and evaluated at bedside   · Treatment options discussed in detail with the patient  · Right ankle x-rays reviewed  · DVT negative x2  · There is increased pain which does not seem to correlate with the induration and darkened skin to the posterior right leg  · Due to patient's pain to the superior calf without evidence of DVT, an MRI will be ordered to evaluate for possible damage to posterior calf muscles. · IV abx: recommend changing to ancef  · NWB to Right lower extremity  · Discussed with Dr. Jewel Puga DPM   Podiatric Medicine & Surgery   4/4/2021 at 9:14 AM       PT SEEN  [address] [facility] 2 WEEKS - NEG DVT VENOUS SCAN. PAIN IN RIGHT LEG HAS CONTINUED AND SWELLING HAS INCREASED- SHE WAS ADMITTED ON 4/2/21 - IV ANTIBIOTICS, REPEAT VENOUS SCAN  WAS NEG. O/ RIGHT LEG MEDIAL CALF IS VERY WARM, MILD ERYTHEMA, NO OPEN WOUNDS NOTED. STASIS DERMATITIS EVIDENT ON BOTH LEGS. MUSCLE ROM 4/5 AGAINST RESISTANCE IN ALL DIRECTIONS  WITH PAIN OF MEDIAL GASTROC .     DIMINISHED SENSATION AT TOES    A/ DM WITH NEUROPATHY, CELLULITIS, ABSCESS MEDIAL LEG    P/ ORDER MRI TO ASSESS DEEPER STRUCTURES AND ENSURE NO ABSCESS FORMATION   CHANGE IV ANTIBIOTIC TO ANCEF  WILL FOLLOW

## 2021-04-04 NOTE — PROGRESS NOTES
[facility] -   Infectious diseases evaluation  admission date 4/2/2021    reason for consultation:   Right leg cellulitis    Impression :   Current:  · Right leg cellulitis rule out DVT  · Status post right ankle surgery 8/27/2015 with hardware in place. · History of COPD  · CHF  · Asthma  · Diabetes mellitus  · Hypertension      Recommendations   · Continue IV vancomycin, add cefepime  · Follow C-reactive protein and sedimentation rate  · Follow blood cultures  · Lower extremity venous Doppler showed no DVT  · Follow CBC, renal function and vancomycin levels  · Right ankle MRI pending    History of Present Illness:   Initial history:  Lynette Falcon is a 60y.o.-year-old female presented to hospital with a 2-week history of right leg and ankle swelling, redness and pain. She was seen at [facility], had venous Doppler done that was negative according to the patient. She has not been on antibiotics, denied fever or chills. She had history of right ankle surgery in 2018 with hardware in place. She is actively smoking. Interval changes  4/4/2021   She is still complaining of right leg swelling, redness and pain. She does have chronic shortness of breath not increased, denied increased cough, denied nausea or vomiting, no other complaints. Patient Vitals for the past 8 hrs:   BP Temp Temp src Pulse Resp SpO2 Weight   04/04/21 1112 (!) 148/72 98.4 °F (36.9 °C) Axillary 73 16 95 % --   04/04/21 0625 (!) 155/56 97.5 °F (36.4 °C) Axillary 79 18 95 % --   04/04/21 0404 -- -- -- -- -- -- (!) 376 lb 12.8 oz (170.9 kg)   04/04/21 0402 (!) 151/91 97.7 °F (36.5 °C) Axillary 80 18 96 % --             I have personally reviewed the past medical history, past surgical history, medications, social history, and family history, and I haveupdated the database accordingly.       Allergies:   Ipratropium-albuterol, Morphine, and Metformin     Review of Systems:     Review of Systems  As per history present illness, other than above 12 system review was negative  Physical Examination :       Physical Exam  Constitutional:       General: She is not in acute distress. Appearance: Normal appearance. HENT:      Right Ear: External ear normal.      Left Ear: External ear normal.      Mouth/Throat:      Pharynx: Oropharynx is clear. No posterior oropharyngeal erythema. Eyes:      General: No scleral icterus. Conjunctiva/sclera: Conjunctivae normal.   Neck:      Musculoskeletal: Neck supple. No neck rigidity. Cardiovascular:      Rate and Rhythm: Normal rate and regular rhythm. Heart sounds: No murmur. Pulmonary:      Effort: Pulmonary effort is normal.      Breath sounds: No wheezing. Abdominal:      General: Abdomen is flat. There is no distension. Palpations: Abdomen is soft. Musculoskeletal:      Right lower leg: Edema present. Left lower leg: Edema present. Comments: Right ankle warmth, mild erythema and swelling extend to the foot dorsum and distal leg, no open wound, old scar. Skin:     Coloration: Skin is not jaundiced. Findings: No bruising. Neurological:      General: No focal deficit present. Mental Status: She is alert and oriented to person, place, and time. Psychiatric:         Mood and Affect: Mood normal.         Behavior: Behavior normal.         Past Medical History:     Past Medical History:   Diagnosis Date    Asthma     Bronchitis     hx of    CHF (congestive heart failure) (HCC)     [facility]     Diabetes mellitus ([facility]     H/O being hospitalized 09/2017    for approx.  1 week with asthma.    H/O cardiac catheterization 10/2014    no stents OP at [address] Last visit 5/2015     Hypertension     LBBB (left bundle branch block) 09/20/2017    On home oxygen therapy     3 liters hooked into the CPAP machine    CECIL on CPAP     uses nightly    Pneumonia  SOB (shortness of breath)     HX OF    Wears glasses        Past Surgical  History:     Past Surgical History:   Procedure Laterality Date     SECTION      x4    LEG TENDON SURGERY Right 2015    was' in ICU AFTER SURGERY FOR FEW DAYS AFTER DISCHARGED FROM [facility] IN , TO EXTENDED CARE FACILITY DUE TO COMPLICATIONS\"    TONSILLECTOMY         Medications:      vancomycin  1,500 mg Intravenous Q12H    vancomycin (VANCOCIN) intermittent dosing (placeholder)   Other RX Placeholder    enoxaparin  40 mg Subcutaneous BID    cefepime  2,000 mg Intravenous Q12H    allopurinol  100 mg Oral Daily    carvedilol  12.5 mg Oral BID WC    colchicine  0.6 mg Oral Daily    furosemide  20 mg Oral BID    pioglitazone  15 mg Oral Daily    rosuvastatin  40 mg Oral Daily    sodium chloride flush  10 mL Intravenous 2 times per day    famotidine  20 mg Oral BID    insulin glargine  20 Units Subcutaneous QAM AC    Liraglutide  1.2 mg Subcutaneous Daily    lisinopril  20 mg Oral BID    polyethylene glycol  17 g Oral Daily    insulin lispro  0-18 Units Subcutaneous TID WC    insulin lispro  0-9 Units Subcutaneous Nightly       Social History:     Social History     Socioeconomic History    Marital status: Legally      Spouse name: Not on file    Number of children: 4    Years of education: Not on file    Highest education level: Not on file   Occupational History    Not on file   Social Needs    Financial resource strain: Not on file    Food insecurity     Worry: Not on file     Inability: Not on file   [employer] needs     Medical: Not on file     Non-medical: Not on file   Tobacco Use    Smoking status: Current Every Day Smoker     Packs/day: 0.50     Years: 20.00     Pack years: 10.00    Smokeless tobacco: Never Used   Substance and Sexual Activity    Alcohol use: No    Drug use: No    Sexual activity: Not on file   Lifestyle    Physical activity     Days per week: Not on

## 2021-04-04 NOTE — PLAN OF CARE
Problem: Falls - Risk of:  Goal: Will remain free from falls  Description: Will remain free from falls  Outcome: Ongoing  Note: Room free of clutter  Hourly rounding   Non-skid socks worn  Side rails up x2  Bed low and locked  Call light in reach  Instructed to call out before getting out of bed  Anticipatory needs met  Bed alarm on  Falling star at the door      Problem: Skin Integrity:  Goal: Absence of new skin breakdown  Description: Absence of new skin breakdown  Outcome: Ongoing  Note: Skin assessment completed and documented  Adi scale updated  Relieve pressure to bony prominences  Avoid shearing  Assess s/sx of infection   Turns side to side  Turns self  Heels elevated  Structural intactness and normal physiological function of skin and mucous membranes

## 2021-04-04 NOTE — PLAN OF CARE
Problem: Falls - Risk of:  Goal: Will remain free from falls  Description: Will remain free from falls  Outcome: Ongoing  Goal: Absence of physical injury  Description: Absence of physical injury  Outcome: Ongoing     Problem: Skin Integrity:  Goal: Will show no infection signs and symptoms  Description: Will show no infection signs and symptoms  Outcome: Ongoing  Goal: Absence of new skin breakdown  Description: Absence of new skin breakdown  Outcome: Ongoing  Goal: Risk for impaired skin integrity will decrease  Description: Risk for impaired skin integrity will decrease  Outcome: Ongoing     Problem: Tobacco Use:  Goal: Inpatient tobacco use cessation counseling participation  Description: Inpatient tobacco use cessation counseling participation  Outcome: Ongoing     Problem: Activity:  Goal: Risk for activity intolerance will decrease  Description: Risk for activity intolerance will decrease  Outcome: Ongoing     Problem:  Activity:  Goal: Risk for activity intolerance will decrease  Description: Risk for activity intolerance will decrease  Outcome: Ongoing     Problem: Coping:  Goal: Ability to adjust to condition or change in health will improve  Description: Ability to adjust to condition or change in health will improve  Outcome: Ongoing     Problem: Fluid Volume:  Goal: Ability to maintain a balanced intake and output will improve  Description: Ability to maintain a balanced intake and output will improve  Outcome: Ongoing     Problem: Health Behavior:  Goal: Ability to identify and utilize available resources and services will improve  Description: Ability to identify and utilize available resources and services will improve  Outcome: Ongoing  Goal: Ability to manage health-related needs will improve  Description: Ability to manage health-related needs will improve  Outcome: Ongoing     Problem: Metabolic:  Goal: Ability to maintain appropriate glucose levels will improve  Description: Ability to maintain

## 2021-04-04 NOTE — PROGRESS NOTES
Progress note  [facility]    Adult Hospitalist      Name: Shea Levi  MRN: [id number]     Acct: [de-identified]  Room: 2018/2018-02    Admit Date: 4/2/2021  8:18 PM  PCP: Mariaelena Rodriguez MD    Primary Problem  Active Problems:    Cellulitis of right leg  Resolved Problems:    * No resolved hospital problems.  *        Assesment:   Right lower extremity cellulitis  Suspected DVT  History of right lower extremity surgery with hardware in place in the past  Diabetes mellitus type 2  Peripheral neuropathy  Essential hypertension  Hyperlipidemia  Obstructive sleep apnea  Morbid obesity, BMI 64.69  History of gout        Plan:   Patient is admitted to MedSur unit  Oxygen to keep SPO2 more than 90%  Telemetry  Check vital signs closely  CBC BMP daily  Blood cultures  Reviewed x-ray shows postsurgical changes and soft tissue edema  Patient likely will need MRI of the foot/ankle, infectious disease considering  IV vancomycin  IV cefepime  Lovenox for DVT prophylaxis  Lower extremity Doppler is pending  Continue Lantus, insulin sliding scale and close monitoring of blood sugar  Continue lisinopril and Coreg  Infectious disease on consult  Podiatry on consult  Continue other medication as below      Scheduled Meds:   vancomycin  1,500 mg Intravenous Q12H    vancomycin (VANCOCIN) intermittent dosing (placeholder)   Other RX Placeholder    enoxaparin  40 mg Subcutaneous BID    cefepime  2,000 mg Intravenous Q12H    allopurinol  100 mg Oral Daily    carvedilol  12.5 mg Oral BID WC    colchicine  0.6 mg Oral Daily    furosemide  20 mg Oral BID    pioglitazone  15 mg Oral Daily    rosuvastatin  40 mg Oral Daily    sodium chloride flush  10 mL Intravenous 2 times per day    famotidine  20 mg Oral BID    insulin glargine  20 Units Subcutaneous QAM AC    Liraglutide  1.2 mg Subcutaneous Daily    lisinopril  20 mg Oral BID    polyethylene glycol  17 g Oral Daily    insulin lispro  0-18 Units Subcutaneous TID WC    insulin lispro  0-9 Units Subcutaneous Nightly     Continuous Infusions:   sodium chloride      dextrose       PRN Meds:  hydrALAZINE, 10 mg, Q6H PRN  HYDROcodone 5 mg - acetaminophen, 1 tablet, Q4H PRN    Or  HYDROcodone 5 mg - acetaminophen, 2 tablet, Q4H PRN  sodium chloride flush, 10 mL, PRN  sodium chloride, 25 mL, PRN  potassium chloride, 40 mEq, PRN    Or  potassium alternative oral replacement, 40 mEq, PRN    Or  potassium chloride, 10 mEq, PRN  magnesium sulfate, 1,000 mg, PRN  promethazine, 12.5 mg, Q6H PRN    Or  ondansetron, 4 mg, Q6H PRN  magnesium hydroxide, 30 mL, Daily PRN  nicotine, 1 patch, Daily PRN  acetaminophen, 650 mg, Q6H PRN    Or  acetaminophen, 650 mg, Q6H PRN  glucose, 15 g, PRN  dextrose, 12.5 g, PRN  glucagon (rDNA), 1 mg, PRN  dextrose, 100 mL/hr, PRN        Chief Complaint:     Right leg cellulitis    History of Present Illness:      Mariaelena Wild is a 60 y.o.  female who presents with No chief complaint on file. Patient seen and examined at bedside and reviewed  last 24 hrs events with nursing staff  No acute events overnight. Patient denies any acute complaints. Afebrile  Patient denies any chest pain, shortness of Breath, palpitation, headache, dizziness, cough, nausea, vomiting, abdominal pain, pain, changes in urination or bowel habit or rash.           HPI  This is a 12-year-old female has been admitted by her PCP Dr. Titus Marcos, directly to our service, patient has seen him as an outpatient for right lower extremity cellulitis, patient is status post right ankle surgery in the past, and has hardware in place, patient has been having issues intermittently with right lower extremity, PCP noticed that patient has more swelling and redness in the lower extremity and patient has past medical history significant for diabetes this is and there is a concern for infection, patient was admitted for further management    I have personally reviewed the past medical history, past surgical history, medications, social history, and family history, and summarized in the note. Review of Systems:     All 10 point system is reviewed and negative otherwise mentioned in HPI. Past Medical History:     Past Medical History:   Diagnosis Date    Asthma     Bronchitis     hx of    CHF (congestive heart failure) (HCC)     COPD (chronic obstructive pulmonary disease) (HCC)     Diabetes mellitus ([facility]     H/O being hospitalized 2017    for approx. 1 week with asthma.    H/O cardiac catheterization 10/2014    no stents OP at [address] Last visit 2015     Hypertension     LBBB (left bundle branch block) 2017    On home oxygen therapy     3 liters hooked into the CPAP machine    CECIL on CPAP     uses nightly    Pneumonia     SOB (shortness of breath)     HX OF    Wears glasses         Past Surgical History:     Past Surgical History:   Procedure Laterality Date     SECTION      x4    LEG TENDON SURGERY Right 2015    was' in [address] [place] [address] [place] , TO [facility] CARE FACILITY DUE TO COMPLICATIONS\"    TONSILLECTOMY          Medications Prior to Admission:       Prior to Admission medications    Medication Sig Start Date End Date Taking?  Authorizing Provider   pioglitazone (ACTOS) 30 MG tablet Take 15 mg by mouth daily   Yes Historical Provider, MD   carvedilol (COREG) 12.5 MG tablet Take 12.5 mg by mouth 2 times daily (with meals)   Yes Historical Provider, MD   allopurinol (ZYLOPRIM) 100 MG tablet Take 100 mg by mouth daily   Yes Historical Provider, MD   colchicine (COLCRYS) 0.6 MG tablet Take 0.6 mg by mouth daily   Yes Historical Provider, MD   furosemide (LASIX) 20 MG tablet Take 20 mg by mouth 2 times daily   Yes Historical Provider, MD   rosuvastatin (CRESTOR) 40 MG tablet Take 40 mg by mouth daily   Yes Historical Provider, MD   oxyCODONE-acetaminophen (PERCOCET) 5-325 MG per tablet Take 1 tablet by mouth every 8 hours as needed for Pain. Yes Historical Provider, MD   insulin glargine (LANTUS) 100 UNIT/ML injection vial Inject 20 Units into the skin every morning (before breakfast)     Historical Provider, MD   Liraglutide (VICTOZA) 18 MG/3ML SOPN SC injection Inject 1.2 mg into the skin daily    Historical Provider, MD   ipratropium-albuterol (DUONEB) 0.5-2.5 (3) MG/3ML SOLN nebulizer solution Inhale 3 mLs into the lungs every 6 hours as needed for Shortness of Breath 9/2/15   Corin Day MD   polyethylene glycol (MIRALAX) PACK packet Take 17 g by mouth daily    Historical Provider, MD   lisinopril (PRINIVIL;ZESTRIL) 20 MG tablet Take 20 mg by mouth 2 times daily    Historical Provider, MD        Allergies:       Ipratropium-albuterol, Morphine, and Metformin    Social History:     Tobacco:    reports that she has been smoking. She has a 10.00 pack-year smoking history. She has never used smokeless tobacco.  Alcohol:      reports no history of alcohol use. Drug Use:  reports no history of drug use.     Family History:     Family History   Problem Relation Age of Onset    Diabetes Mother     Hypertension Mother     Kidney Disease Mother     Asthma Father          Physical Exam:     Vitals:  BP (!) 155/56   Pulse 79   Temp 97.5 °F (36.4 °C) (Axillary)   Resp 18   Ht 5' 4\" (1.626 m)   Wt (!) 376 lb 12.8 oz (170.9 kg)   LMP 07/10/2017   SpO2 95%   BMI 64.68 kg/m²   Temp (24hrs), Av.7 °F (36.5 °C), Min:97.5 °F (36.4 °C), Max:98.1 °F (36.7 °C)          General appearance - alert, well appearing, and in no acute distress  Mental status - oriented to person, place, and time with normal affect  Head - normocephalic and atraumatic  Eyes - pupils equal and reactive, extraocular eye movements intact, conjunctiva clear  Ears - hearing appears to be intact  Nose - no drainage noted  Mouth - mucous membranes moist  Neck - supple, no carotid bruits, thyroid not palpable  Chest - clear to auscultation, normal effort  Heart - normal rate, regular rhythm, no murmur  Abdomen - soft, nontender, nondistended, bowel sounds present all four quadrants, no masses, hepatomegaly or splenomegaly  Neurological - normal speech, no focal findings or movement disorder noted, cranial nerves II through XII grossly intact  Extremities - peripheral pulses palpable, right lower extremity swelling and redness  Skin - no gross lesions, rashes, or induration noted        Data:     Labs:    Hematology:  Recent Labs     04/02/21 2338 04/03/21  0646 04/04/21  0623   WBC 4.8 4.8 4.7   RBC 4.72 4.54 4.53   HGB 13.3 13.1 13.0   HCT 44.9 43.8 43.9   MCV 95.1 96.5 96.9   MCH 28.2 28.9 28.7   MCHC 29.6 29.9 29.6   RDW 14.2 14.0 14.1    229 234   MPV 10.4 10.4 10.5   SEDRATE 85*  --   --    CRP <3.0  --   --    INR  --  1.1  --      Chemistry:  Recent Labs     04/02/21 2338 04/03/21  0646 04/04/21  0623    139 140   K 4.1 4.2 4.3    101 100   CO2 27 29 29   GLUCOSE 106* 131* 115*   BUN 19 17 16   CREATININE 0.75 0.77 0.77   ANIONGAP 13 9 11   LABGLOM >60 >60 >60   GFRAA >60 >60 >60   CALCIUM 9.2 8.9 9.1     Recent Labs     04/02/21  2241 04/03/21  0613 04/03/21  1234 04/03/21  1559 04/03/21 2012 04/04/21  0623   URICACID  --   --   --   --   --  5.1   POCGLU 92 116* 252* 106* 120* 107*       Lab Results   Component Value Date    INR 1.1 04/03/2021    PROTIME 14.0 04/03/2021       Lab Results   Component Value Date/Time    SPECIAL NOT REPORTED 04/02/2021 10:01 PM     Lab Results   Component Value Date/Time    CULTURE NO GROWTH 2 DAYS 04/02/2021 10:01 PM       Lab Results   Component Value Date    PHART 7.290 08/29/2015    HXH9BMZ 70.0 08/29/2015    PO2ART 51.0 08/29/2015    JIE1WWM 32.6 08/29/2015    NBEA NOT REPORTED 08/29/2015    PBEA 4.9 08/29/2015    H4AMTFSO 89.0 08/29/2015    FIO2 BIPAP 08/29/2015       Radiology:    Xr Ankle Right (min 3 Views)    Result Date: 4/3/2021  1.  Postsurgical changes within the subtalar joint and region of the medial navicular. 2. Moderate soft tissue edema and soft tissue swelling about the right lower leg, right ankle and right foot. 3. Degenerative changes and diffuse osteopenia as above. 4. No acute fracture or dislocation. Xr Chest Portable    Result Date: 4/2/2021  Mild pulmonary edema. Mild cardiomegaly. All radiological studies reviewed                Code Status:  Full Code    Electronically signed by Lainey García MD on 4/4/2021 at 11:05 AM     Copy sent to Dr. Mikal Sharp MD    This note was created with the assistance of a speech-recognition program.  Although the intention is to generate a document that actually reflects the content of the visit, no guarantees can be provided that every mistake has been identified and corrected by editing. Note was updated later by me after  physical examination and  completion of the assessment.

## 2021-04-05 ENCOUNTER — APPOINTMENT (OUTPATIENT)
Dept: MRI IMAGING | Age: 56
DRG: 074 | End: 2021-04-05
Attending: FAMILY MEDICINE
Payer: MEDICARE

## 2021-04-05 VITALS
DIASTOLIC BLOOD PRESSURE: 57 MMHG | HEART RATE: 80 BPM | HEIGHT: 64 IN | RESPIRATION RATE: 18 BRPM | BODY MASS INDEX: 50.02 KG/M2 | OXYGEN SATURATION: 97 % | TEMPERATURE: 98.1 F | WEIGHT: 293 LBS | SYSTOLIC BLOOD PRESSURE: 136 MMHG

## 2021-04-05 LAB
ABSOLUTE EOS #: 0.06 K/UL (ref 0–0.44)
ABSOLUTE IMMATURE GRANULOCYTE: 0.01 K/UL (ref 0–0.3)
ABSOLUTE LYMPH #: 1.66 K/UL (ref 1.1–3.7)
ABSOLUTE MONO #: 0.56 K/UL (ref 0.1–1.2)
ANION GAP SERPL CALCULATED.3IONS-SCNC: 8 MMOL/L (ref 9–17)
BASOPHILS # BLD: 1 % (ref 0–2)
BASOPHILS ABSOLUTE: 0.03 K/UL (ref 0–0.2)
BUN BLDV-MCNC: 14 MG/DL (ref 6–20)
BUN/CREAT BLD: 18 (ref 9–20)
CALCIUM SERPL-MCNC: 9.2 MG/DL (ref 8.6–10.4)
CHLORIDE BLD-SCNC: 99 MMOL/L (ref 98–107)
CO2: 32 MMOL/L (ref 20–31)
CREAT SERPL-MCNC: 0.77 MG/DL (ref 0.5–0.9)
DIFFERENTIAL TYPE: ABNORMAL
EOSINOPHILS RELATIVE PERCENT: 1 % (ref 1–4)
GFR AFRICAN AMERICAN: >60 ML/MIN
GFR NON-AFRICAN AMERICAN: >60 ML/MIN
GFR SERPL CREATININE-BSD FRML MDRD: ABNORMAL ML/MIN/{1.73_M2}
GFR SERPL CREATININE-BSD FRML MDRD: ABNORMAL ML/MIN/{1.73_M2}
GLUCOSE BLD-MCNC: 104 MG/DL (ref 65–105)
GLUCOSE BLD-MCNC: 116 MG/DL (ref 65–105)
GLUCOSE BLD-MCNC: 118 MG/DL (ref 70–99)
GLUCOSE BLD-MCNC: 99 MG/DL (ref 65–105)
HCT VFR BLD CALC: 43.2 % (ref 36.3–47.1)
HEMOGLOBIN: 12.7 G/DL (ref 11.9–15.1)
IMMATURE GRANULOCYTES: 0 %
LYMPHOCYTES # BLD: 39 % (ref 24–43)
MCH RBC QN AUTO: 28.3 PG (ref 25.2–33.5)
MCHC RBC AUTO-ENTMCNC: 29.4 G/DL (ref 28.4–34.8)
MCV RBC AUTO: 96.4 FL (ref 82.6–102.9)
MONOCYTES # BLD: 13 % (ref 3–12)
NRBC AUTOMATED: 0 PER 100 WBC
PDW BLD-RTO: 14 % (ref 11.8–14.4)
PLATELET # BLD: 204 K/UL (ref 138–453)
PLATELET ESTIMATE: ABNORMAL
PMV BLD AUTO: 10.2 FL (ref 8.1–13.5)
POTASSIUM SERPL-SCNC: 3.9 MMOL/L (ref 3.7–5.3)
RBC # BLD: 4.48 M/UL (ref 3.95–5.11)
RBC # BLD: ABNORMAL 10*6/UL
SEG NEUTROPHILS: 46 % (ref 36–65)
SEGMENTED NEUTROPHILS ABSOLUTE COUNT: 1.98 K/UL (ref 1.5–8.1)
SODIUM BLD-SCNC: 139 MMOL/L (ref 135–144)
WBC # BLD: 4.3 K/UL (ref 3.5–11.3)
WBC # BLD: ABNORMAL 10*3/UL

## 2021-04-05 PROCEDURE — 6360000004 HC RX CONTRAST MEDICATION: Performed by: PODIATRIST

## 2021-04-05 PROCEDURE — 6360000002 HC RX W HCPCS: Performed by: INTERNAL MEDICINE

## 2021-04-05 PROCEDURE — 80048 BASIC METABOLIC PNL TOTAL CA: CPT

## 2021-04-05 PROCEDURE — 36415 COLL VENOUS BLD VENIPUNCTURE: CPT

## 2021-04-05 PROCEDURE — 97110 THERAPEUTIC EXERCISES: CPT

## 2021-04-05 PROCEDURE — 73720 MRI LWR EXTREMITY W/O&W/DYE: CPT

## 2021-04-05 PROCEDURE — 82947 ASSAY GLUCOSE BLOOD QUANT: CPT

## 2021-04-05 PROCEDURE — 97116 GAIT TRAINING THERAPY: CPT

## 2021-04-05 PROCEDURE — 2580000003 HC RX 258: Performed by: PODIATRIST

## 2021-04-05 PROCEDURE — 6360000002 HC RX W HCPCS: Performed by: FAMILY MEDICINE

## 2021-04-05 PROCEDURE — 2580000003 HC RX 258: Performed by: INTERNAL MEDICINE

## 2021-04-05 PROCEDURE — 6370000000 HC RX 637 (ALT 250 FOR IP): Performed by: HOSPITALIST

## 2021-04-05 PROCEDURE — 97535 SELF CARE MNGMENT TRAINING: CPT

## 2021-04-05 PROCEDURE — 97530 THERAPEUTIC ACTIVITIES: CPT

## 2021-04-05 PROCEDURE — 6370000000 HC RX 637 (ALT 250 FOR IP): Performed by: FAMILY MEDICINE

## 2021-04-05 PROCEDURE — 99232 SBSQ HOSP IP/OBS MODERATE 35: CPT | Performed by: INTERNAL MEDICINE

## 2021-04-05 PROCEDURE — 2700000000 HC OXYGEN THERAPY PER DAY

## 2021-04-05 PROCEDURE — 85025 COMPLETE CBC W/AUTO DIFF WBC: CPT

## 2021-04-05 PROCEDURE — 2580000003 HC RX 258: Performed by: FAMILY MEDICINE

## 2021-04-05 PROCEDURE — A9579 GAD-BASE MR CONTRAST NOS,1ML: HCPCS | Performed by: PODIATRIST

## 2021-04-05 RX ORDER — DOXYCYCLINE HYCLATE 100 MG
100 TABLET ORAL 2 TIMES DAILY
Qty: 14 TABLET | Refills: 0 | Status: SHIPPED | OUTPATIENT
Start: 2021-04-05 | End: 2021-04-12

## 2021-04-05 RX ORDER — CEFUROXIME AXETIL 500 MG/1
500 TABLET ORAL 2 TIMES DAILY
Qty: 20 TABLET | Refills: 0 | Status: SHIPPED | OUTPATIENT
Start: 2021-04-05 | End: 2021-04-12

## 2021-04-05 RX ORDER — SODIUM CHLORIDE 0.9 % (FLUSH) 0.9 %
10 SYRINGE (ML) INJECTION
Status: COMPLETED | OUTPATIENT
Start: 2021-04-05 | End: 2021-04-05

## 2021-04-05 RX ADMIN — CEFEPIME HYDROCHLORIDE 2000 MG: 2 INJECTION, POWDER, FOR SOLUTION INTRAVENOUS at 03:29

## 2021-04-05 RX ADMIN — VANCOMYCIN HYDROCHLORIDE 1500 MG: 5 INJECTION, POWDER, LYOPHILIZED, FOR SOLUTION INTRAVENOUS at 12:52

## 2021-04-05 RX ADMIN — CARVEDILOL 12.5 MG: 12.5 TABLET, FILM COATED ORAL at 08:33

## 2021-04-05 RX ADMIN — COLCHICINE 0.6 MG: 0.6 TABLET ORAL at 08:33

## 2021-04-05 RX ADMIN — VANCOMYCIN HYDROCHLORIDE 1500 MG: 5 INJECTION, POWDER, LYOPHILIZED, FOR SOLUTION INTRAVENOUS at 00:07

## 2021-04-05 RX ADMIN — POLYETHYLENE GLYCOL 3350 17 G: 17 POWDER, FOR SOLUTION ORAL at 08:34

## 2021-04-05 RX ADMIN — INSULIN GLARGINE 20 UNITS: 100 INJECTION, SOLUTION SUBCUTANEOUS at 05:51

## 2021-04-05 RX ADMIN — FUROSEMIDE 20 MG: 20 TABLET ORAL at 10:42

## 2021-04-05 RX ADMIN — Medication 10 ML: at 09:57

## 2021-04-05 RX ADMIN — FAMOTIDINE 20 MG: 20 TABLET ORAL at 08:35

## 2021-04-05 RX ADMIN — PIOGLITAZONE 15 MG: 15 TABLET ORAL at 08:33

## 2021-04-05 RX ADMIN — GADOTERIDOL 20 ML: 279.3 INJECTION, SOLUTION INTRAVENOUS at 09:57

## 2021-04-05 RX ADMIN — ENOXAPARIN SODIUM 40 MG: 40 INJECTION SUBCUTANEOUS at 08:34

## 2021-04-05 RX ADMIN — LISINOPRIL 20 MG: 20 TABLET ORAL at 08:33

## 2021-04-05 RX ADMIN — HYDROCODONE BITARTRATE AND ACETAMINOPHEN 1 TABLET: 5; 325 TABLET ORAL at 10:42

## 2021-04-05 RX ADMIN — ALLOPURINOL 100 MG: 100 TABLET ORAL at 08:33

## 2021-04-05 ASSESSMENT — PAIN SCALES - GENERAL: PAINLEVEL_OUTOF10: 4

## 2021-04-05 NOTE — PROGRESS NOTES
CLINICAL PHARMACY NOTE: MEDS TO [address] [place] [address] Drive Select Patient?: No  Total # of Prescriptions Filled: 2   The following medications were delivered to the patient:  · CEFUROXIME AXE 500MG  · DOXYCYCLINE 100MG  Total # of Interventions Completed: 0  Time Spent (min): 30    Additional Documentation:

## 2021-04-05 NOTE — PROGRESS NOTES
Physician Progress Note      PATIENT:               Tae Akbar  CSN #:                  [id number]  :                       1965  ADMIT DATE:       2021 8:18 PM  Serafin Hagan DATE:        2021 6:18 PM  RESPONDING  PROVIDER #:        Elham Jones MD          QUERY TEXT:    Pt admitted with  RLE  cellulitis. Pt noted to have DM with neuropathy. If   possible, please document in progress notes and discharge summary the   relationship, if any, between cellulitis and DM. The medical record reflects the following:  Risk Factors: age 59 yo with cellulitis documented,  Clinical Indicators: noticed that patient has more swelling and redness in the   lower extremity , painful, Cellulitis documented and patient is a known   diabetic w neuropathy and has diminished sensation in lower extremities. Treatment:IV vancomycin , IV cefepime , Podiatry on board. Thank you,  Aishwarya Villafana, MSN, CDS, CRCR  Options provided:  -- RLE  cellulitis associated with Diabetes with neuropathy  -- RLE  cellulitis unrelated to Diabetes  -- Other - I will add my own diagnosis  -- Disagree - Not applicable / Not valid  -- Disagree - Clinically unable to determine / Unknown  -- Refer to Clinical Documentation Reviewer    PROVIDER RESPONSE TEXT:    RLE cellulitis associated with Diabetes with neuropathy . Query created by: Mary Martin on 2021 10:18 AM      QUERY TEXT:    Patient admitted with Cellulitis . Noted documentation of rule out DVT. however, venous study demonstrates no evidence of DVT per doppler . If   possible, please document in progress notes and discharge summary if you are   evaluating and /or treating any of the following: The medical record reflects the following:  Risk Factors: age 59 yo w cellulitis documented in RLE , [facility]  of surgery in   the right ankle  Clinical Indicators: Documentation of rule out DVT, however doppler revealed   no evidence of DVT.   Treatment: DVT doppler study ,  Lovenox 40 mg SC BID , monitor site for   changes ,  Thank you,  Karin Goldsmith, MSN, CDS, CRCR  Options provided:  -- DVT  confirmed  -- DVT  ruled out  -- Other - I will add my own diagnosis  -- Disagree - Not applicable / Not valid  -- Disagree - Clinically unable to determine / Unknown  -- Refer to Clinical Documentation Reviewer    PROVIDER RESPONSE TEXT:    After study, DVT ruled out.     Query created by: Víctor Metcalf on 4/5/2021 10:26 AM      Electronically signed by:  Rogelio Carter MD 4/5/2021 7:27 PM

## 2021-04-05 NOTE — PROGRESS NOTES
Transfers  Sit to Stand: Contact guard assistance  Stand to sit: Contact guard assistance  Stand Pivot Transfers: Contact guard assistance  Lateral Transfers: Contact guard assistance  Ambulation  Ambulation?: Yes  More Ambulation?: Yes  Ambulation 1  Surface: level tile  Device: Rolling Walker  Assistance: Contact guard assistance  Quality of Gait: Steady gait using bariatric walker  Gait Deviations: Slow Sarah  Distance: 25ft  Comments: Verbal cues for hand placement when going from sit to stand using a RW  Ambulation 2  Surface - 2: level tile  Device 2: [address] 2: Contact guard assistance  Quality of Gait 2: steady gait, decreased endurance  Gait Deviations: Slow Sarah  Distance: 25ft  Stairs/Curb  Stairs?: No     Balance  Posture: Good  Sitting - Static: Good  Sitting - Dynamic: Good  Standing - Static: Fair;+  Standing - Dynamic: Fair;+  Exercises  Hip Flexion: 2x10(seated)  Knee Long Arc Quad: 2x10  Ankle Pumps: 20  Written instruction given for chair exercises       AM-PAC Score     AM-PAC Inpatient Mobility without Stair Climbing Raw Score : 15 (04/05/21 1248)  AM-PAC Inpatient without Stair Climbing T-Scale Score : 43.03 (04/05/21 1248)  Mobility Inpatient CMS 0-100% Score: 47.43 (04/05/21 1248)  Mobility Inpatient without Stair CMS G-Code Modifier : CK (04/05/21 1248)       Goals  Short term goals  Time Frame for Short term goals: 12 treatments  Short term goal 1: Independent transfers/bed mobility  Short term goal 2:  Independent ambulation w/ RW NWB R LE 75' x 1  Short term goal 3: Good standing balance  Short term goal 4: Tolerate 30 min ther act  Short term goal 5: CGA ascending/descending 2 steps w B HR  Patient Goals   Patient goals : Home ASAP    Plan    Plan  Times per week: 1-2x/day,6-7 days/week  Current Treatment Recommendations: Transfer Training, Balance Training, Endurance Training, Gait Training, Stair training  Safety Devices  Type of devices: Nurse notified, Left

## 2021-04-05 NOTE — PLAN OF CARE
Problem: Falls - Risk of:  Goal: Will remain free from falls  Description: Will remain free from falls  Outcome: Completed  Goal: Absence of physical injury  Description: Absence of physical injury  Outcome: Completed     Problem: Skin Integrity:  Goal: Will show no infection signs and symptoms  Description: Will show no infection signs and symptoms  Outcome: Completed  Goal: Absence of new skin breakdown  Description: Absence of new skin breakdown  Outcome: Completed  Goal: Risk for impaired skin integrity will decrease  Description: Risk for impaired skin integrity will decrease  Outcome: Completed     Problem: Tobacco Use:  Goal: Inpatient tobacco use cessation counseling participation  Description: Inpatient tobacco use cessation counseling participation  Outcome: Completed     Problem:  Activity:  Goal: Risk for activity intolerance will decrease  Description: Risk for activity intolerance will decrease  Outcome: Completed     Problem: Coping:  Goal: Ability to adjust to condition or change in health will improve  Description: Ability to adjust to condition or change in health will improve  Outcome: Completed     Problem: Fluid Volume:  Goal: Ability to maintain a balanced intake and output will improve  Description: Ability to maintain a balanced intake and output will improve  Outcome: Completed     Problem: Health Behavior:  Goal: Ability to identify and utilize available resources and services will improve  Description: Ability to identify and utilize available resources and services will improve  Outcome: Completed  Goal: Ability to manage health-related needs will improve  Description: Ability to manage health-related needs will improve  Outcome: Completed     Problem: Metabolic:  Goal: Ability to maintain appropriate glucose levels will improve  Description: Ability to maintain appropriate glucose levels will improve  Outcome: Completed     Problem: Nutritional:  Goal: Maintenance of adequate nutrition will improve  Description: Maintenance of adequate nutrition will improve  Outcome: Completed  Goal: Progress toward achieving an optimal weight will improve  Description: Progress toward achieving an optimal weight will improve  Outcome: Completed     Problem: Physical Regulation:  Goal: Complications related to the disease process, condition or treatment will be avoided or minimized  Description: Complications related to the disease process, condition or treatment will be avoided or minimized  Outcome: Completed  Goal: Diagnostic test results will improve  Description: Diagnostic test results will improve  Outcome: Completed     Problem: Tissue Perfusion:  Goal: Adequacy of tissue perfusion will improve  Description: Adequacy of tissue perfusion will improve  Outcome: Completed     Problem: Pain:  Goal: Pain level will decrease  Description: Pain level will decrease  Outcome: Completed  Goal: Control of acute pain  Description: Control of acute pain  Outcome: Completed  Goal: Control of chronic pain  Description: Control of chronic pain  Outcome: Completed     Problem: ABCDS Injury Assessment  Goal: Absence of physical injury  Outcome: Completed     Problem: IP BALANCE  Goal: LTG - Patient will maintain balance to allow for safe/functional mobility  Outcome: Completed  Goal: LTG - patient will maintain standing balance to allow for completion of daily activities  Outcome: Completed  Goal: BALANCE EDUCATION  Description: Educate patients on maintaining dynamic/static standing/sitting balance, with/without upper extremity support.   Outcome: Completed     Problem: IP BATHING  Goal: LTG - Patient will utilize adaptive techniques to bathe body  Outcome: Completed  Goal: STG - patient will bathe body  Outcome: Completed     Problem: IP DRESSINGS LOWER EXTREMITIES  Goal: LTG - patient will dress lower body with or without assistive device  Outcome: Completed  Goal: STG - Patient will dress lower body from a seated

## 2021-04-05 NOTE — PROGRESS NOTES
[facility] -   Infectious diseases evaluation  admission date 4/2/2021    reason for consultation:   Right leg cellulitis    Impression :   Current:  · Right leg cellulitis  · Status post right ankle surgery 8/27/2015 with hardware in place. · History of COPD  · CHF  · Asthma  · Diabetes mellitus  · Hypertension      Recommendations        On IV vancomycin and cefepime  · P.o. Ceftin 500 mg twice a day and doxycycline 100 mg twice a day for 1 week upon discharge  · Follow final blood cultures, no growth to date  · Lower extremity venous Doppler showed no DVT  · Right ankle MRI reviewed showed superficial soft tissue edema, no abscess. · Follow C-reactive protein and sedimentation rate in 1 week  · Discussed with nursing staff  · Follow-up in 1 to 2 weeks    History of Present Illness:   Initial history:  Simone Wick is a 60y.o.-year-old female presented to hospital with a 2-week history of right leg and ankle swelling, redness and pain. She was seen at [facility], had venous Doppler done that was negative according to the patient. She has not been on antibiotics, denied fever or chill. She had history of right ankle surgery in 2018 with hardware in place. She is actively smoking. Interval changes  4/5/2021   She is still complaining of right leg swelling, redness and pain is improving. She does have chronic shortness of breath not increased, denied increased cough, denied nausea or vomiting, no other complaints. Sedimentation rate 103, C-reactive protein normal  Patient Vitals for the past 8 hrs:   BP Temp Temp src Pulse Resp SpO2   04/05/21 0728 (!) 136/57 98.1 °F (36.7 °C) Axillary 80 18 97 %             I have personally reviewed the past medical history, past surgical history, medications, social history, and family history, and I haveupdated the database accordingly.       Allergies:   Ipratropium-albuterol, Morphine, and Metformin     Review of Systems:     Review of Systems  As per history present illness, other than above 12 system review was negative  Physical Examination :       Physical Exam  Constitutional:       General: She is not in acute distress. Appearance: Normal appearance. HENT:      Right Ear: External ear normal.      Left Ear: External ear normal.      Mouth/Throat:      Pharynx: Oropharynx is clear. No posterior oropharyngeal erythema. Eyes:      General: No scleral icterus. Conjunctiva/sclera: Conjunctivae normal.   Neck:      Musculoskeletal: Neck supple. No neck rigidity. Cardiovascular:      Rate and Rhythm: Normal rate and regular rhythm. Heart sounds: No murmur. Pulmonary:      Effort: Pulmonary effort is normal.      Breath sounds: No wheezing. Abdominal:      General: Abdomen is flat. There is no distension. Palpations: Abdomen is soft. Musculoskeletal:      Right lower leg: Edema present. Left lower leg: Edema present. Comments: Right ankle warmth, mild erythema and swelling extend to the foot dorsum and distal leg improved, no open wound, old scar. Skin:     Coloration: Skin is not jaundiced. Findings: No bruising. Neurological:      General: No focal deficit present. Mental Status: She is alert and oriented to person, place, and time. Psychiatric:         Mood and Affect: Mood normal.         Behavior: Behavior normal.         Past Medical History:     Past Medical History:   Diagnosis Date    Asthma     Bronchitis     hx of    CHF (congestive heart failure) (HCC)     [facility]     Diabetes mellitus ([facility]     H/O being hospitalized 09/2017    for approx.  1 week with asthma.    H/O cardiac catheterization 10/2014    no stents OP at [address] Last visit 5/2015     Hypertension     LBBB (left bundle branch block) 09/20/2017    On home oxygen therapy     3 liters hooked into the CPAP machine    CECIL on CPAP uses nightly    Pneumonia     SOB (shortness of breath)     HX OF    Wears glasses        Past Surgical  History:     Past Surgical History:   Procedure Laterality Date     SECTION      x4    LEG TENDON SURGERY Right 2015    was' in ICU AFTER SURGERY FOR FEW DAYS AFTER DISCHARGED [facility] , TO EXTENDED CARE FACILITY DUE TO COMPLICATIONS\"    TONSILLECTOMY         Medications:      vancomycin  1,500 mg Intravenous Q12H    vancomycin (VANCOCIN) intermittent dosing (placeholder)   Other RX Placeholder    enoxaparin  40 mg Subcutaneous BID    cefepime  2,000 mg Intravenous Q12H    allopurinol  100 mg Oral Daily    carvedilol  12.5 mg Oral BID WC    colchicine  0.6 mg Oral Daily    furosemide  20 mg Oral BID    pioglitazone  15 mg Oral Daily    rosuvastatin  40 mg Oral Daily    sodium chloride flush  10 mL Intravenous 2 times per day    famotidine  20 mg Oral BID    insulin glargine  20 Units Subcutaneous QAM AC    Liraglutide  1.2 mg Subcutaneous Daily    lisinopril  20 mg Oral BID    polyethylene glycol  17 g Oral Daily    insulin lispro  0-18 Units Subcutaneous TID WC    insulin lispro  0-9 Units Subcutaneous Nightly       Social History:     Social History     Socioeconomic History    Marital status: Legally      Spouse name: Not on file    Number of children: 4    Years of education: Not on file    Highest education level: Not on file   Occupational History    Not on file   Social Needs    Financial resource strain: Not on file    Food insecurity     Worry: Not on file     Inability: Not on file   [employer] needs     Medical: Not on file     Non-medical: Not on file   Tobacco Use    Smoking status: Current Every Day Smoker     Packs/day: 0.50     Years: 20.00     Pack years: 10.00    Smokeless tobacco: Never Used   Substance and Sexual Activity    Alcohol use: No    Drug use: No    Sexual activity: Not on file   Lifestyle    Physical

## 2021-04-05 NOTE — PROGRESS NOTES
Progress Note  Podiatric Medicine and Surgery     Subjective     CC: Right leg pain and sweling    Patient seen and examined at bedside. Hypertensive, VS otherwise stable  No acute events overnight. Patient reports pain and swelling have minimally improved since admission but overall still very painful. MRI today    HPI :  Angélica Sow is a 60 y.o. female seen at [facility] for pain and swelling in her right leg and ankle. The patient states the symptoms began about 2 weeks ago making it difficult to ambulate. She denies any trauma. She states she went to [facility] where they performed a \"scan to look for blood clots,\" but did not find any. She states she has continued to have pain and swelling in the right leg and her PCP sent her to the hospital for admission. She denies any nausea, vomiting, fever, or chills. She follows with Dr. Roberta Michelle, having a gastroc recession, arthroeresis, PT debridement with FDL transfer on the right in 2015. She is diabetic with reported peripheral neuropathy. She has not other pedal complaints at this time. ROS:   Review of Systems   Constitutional: Negative for chills and fever. Respiratory: Positive for shortness of breath (Baseline). Cardiovascular: Positive for chest pain (Baseline) and leg swelling. Gastrointestinal: Negative for diarrhea, nausea and vomiting. Musculoskeletal: Positive for gait problem and joint swelling. Skin: Positive for color change. Negative for wound. Neurological: Positive for numbness (Peripheral neuropathy). Psychiatric/Behavioral: Negative for agitation.      Medications:  Scheduled Meds:   vancomycin  1,500 mg Intravenous Q12H    vancomycin (VANCOCIN) intermittent dosing (placeholder)   Other RX Placeholder    enoxaparin  40 mg Subcutaneous BID    cefepime  2,000 mg Intravenous Q12H    allopurinol  100 mg Oral Daily    carvedilol  12.5 mg Oral BID WC    colchicine  0.6 mg Oral Daily    furosemide  20 mg Oral BID    pioglitazone  15 mg Oral Daily    rosuvastatin  40 mg Oral Daily    sodium chloride flush  10 mL Intravenous 2 times per day    famotidine  20 mg Oral BID    insulin glargine  20 Units Subcutaneous QAM AC    Liraglutide  1.2 mg Subcutaneous Daily    lisinopril  20 mg Oral BID    polyethylene glycol  17 g Oral Daily    insulin lispro  0-18 Units Subcutaneous TID WC    insulin lispro  0-9 Units Subcutaneous Nightly       Continuous Infusions:   sodium chloride      dextrose         PRN Meds:hydrALAZINE, HYDROcodone 5 mg - acetaminophen **OR** HYDROcodone 5 mg - acetaminophen, sodium chloride flush, sodium chloride, potassium chloride **OR** potassium alternative oral replacement **OR** potassium chloride, magnesium sulfate, promethazine **OR** ondansetron, magnesium hydroxide, nicotine, acetaminophen **OR** acetaminophen, glucose, dextrose, glucagon (rDNA), dextrose    Objective     Vitals:  Patient Vitals for the past 8 hrs:   BP Temp Temp src Pulse Resp SpO2 Weight   21 0728 (!) 136/57 98.1 °F (36.7 °C) Axillary 80 18 97 % --   21 0412 -- -- -- -- -- -- (!) 380 lb 9.6 oz (172.6 kg)   21 0347 (!) 140/80 97.3 °F (36.3 °C) Axillary 85 17 94 % --     Average, Min, and Max for last 24 hours Vitals:  TEMPERATURE:  Temp  Av.9 °F (36.6 °C)  Min: 97.3 °F (36.3 °C)  Max: 98.4 °F (36.9 °C)    RESPIRATIONS RANGE: Resp  Av.8  Min: 16  Max: 18    PULSE RANGE: Pulse  Av  Min: 73  Max: 88    BLOOD PRESSURE RANGE:  Systolic (32BKK), MRO:318 , Min:134 , QNE:362   ; Diastolic (97MZL), UOE:41, Min:57, Max:81      PULSE OXIMETRY RANGE: SpO2  Av.4 %  Min: 80 %  Max: 97 %    I/O last 3 completed shifts:   In: 600.1 [IV Piggyback:600.1]  Out: -     CBC:  Recent Labs     21  2338 21  0646 21  0623 21  1421 21  0516   WBC 4.8 4.8 4.7  --  4.3   HGB 13.3 13.1 13.0  --  12.7   HCT 44.9 43.8 43.9  --  43.2    229 234  --  204   CRP <3.0  --   --  4.0  -- BMP:  Recent Labs     04/03/21  0646 04/04/21  0623 04/05/21  0516    140 139   K 4.2 4.3 3.9    100 99   CO2 29 29 32*   BUN 17 16 14   CREATININE 0.77 0.77 0.77   GLUCOSE 131* 115* 118*   CALCIUM 8.9 9.1 9.2        Coags:  Recent Labs     04/03/21  0646   INR 1.1       Lab Results   Component Value Date    SEDRATE 103 (H) 04/04/2021     Recent Labs     04/02/21  2338 04/04/21  1421   CRP <3.0 4.0     Lower Extremity Physical Exam:  Vascular: DP and PT pulses are non palpable, but audible on doppler. CFT <3 seconds to all digits. Hair growth is absent to the level of the digits. Moderate nonpitting edema to bilateral lower extremities, right worse than left. Mild increase in warmth to the posterior calf of right leg. Improved since yesterday.     Neuro: Saph/sural/SP/DP/plantar sensation diminished to light touch.     Musculoskeletal: Muscle strength is 5/5 to plantarflexion of right foot, reports no pain. some pain to inversion, eversion or dorsiflexion; pain located to superior calf. Pain on palpation of superior calf medial and lateral. Gross deformity is absent.     Dermatologic: No open lesions present. Darkened skin present to medial and lateral calf. No pain to palpation to the indurated areas. No fluctuance or crepitus. Interdigital maceration absent. Clinical Images:          Imaging:   MRI TIBIA FIBULA RIGHT W WO CONTRAST   Preliminary Result   1. Superficial soft tissue edema in the anteromedial mid right leg could   represent cellulitis. 2. No abscess. No intramuscular edema. VL Lower Extremity Venous Right   Final Result      XR ANKLE RIGHT (MIN 3 VIEWS)   Final Result   1. Postsurgical changes within the subtalar joint and region of the medial   navicular. 2. Moderate soft tissue edema and soft tissue swelling about the right lower   leg, right ankle and right foot. 3. Degenerative changes and diffuse osteopenia as above. 4. No acute fracture or dislocation. XR CHEST PORTABLE   Final Result   Mild pulmonary edema. Mild cardiomegaly. Cultures: none     Assessment   Marce Rodriges is a 60 y.o. female with   1. Right lower extremity cellulitis  2. DM type II with peripheral neuropathy  3. Morbid obesity   4. Pes planus s/p arthroeresis, PT debridement with FDL tx (2015)    Active Problems:    Cellulitis of right leg  Resolved Problems:    * No resolved hospital problems. *       Plan     · Patient examined and evaluated at bedside   · Treatment options discussed in detail with the patient  · ESR increased from 85 to 103  · Right ankle x-rays reviewed  · MRI reviewed: Soft tissue edema concerning for possible cellulitis. · DVT scan negative x2  · There is increased pain which does not seem to correlate with the induration and darkened skin to the posterior right leg  · IV abx: ID consulted, appreciated recommendations. · NWB to Right lower extremity  · IV abx appear to be helping as evidenced by the decrease in temperature to the RLE. Will plan to keep overnight for continued abx and evaluate tomorrow.   · Discussed with TRENTON RomeroM   Podiatric Medicine & Surgery   4/5/2021 at 11:08 AM

## 2021-04-05 NOTE — DISCHARGE SUMMARY
[address] [place] [address],    Adult Hospitalist      Patient ID: Tammi Jason  MRN: [id number]     Acct:  [de-identified]       Patient's PCP: Fede Akhtar MD    Admit Date: 4/2/2021     Discharge Date:   4/5/21    Admitting Physician: Tigre Ng MD    Discharge Physician: Peña Neff MD     CONSULTANTS: Patient Care Team:  Fede Akhtar MD as PCP - General (Family Medicine)      Active Discharge Diagnoses:  Right lower extremity cellulitis  History of right lower extremity surgery with hardware in place in the past  Diabetes mellitus type 2  Peripheral neuropathy  Essential hypertension  Hyperlipidemia  Obstructive sleep apnea  Morbid obesity, BMI 64.69  History of gout    Hospital Course: This is a 25-year-old female has been admitted by her PCP Dr. Robin Kapadia, directly to our service, patient has seen him as an outpatient for right lower extremity cellulitis, patient is status post right ankle surgery in the past, and has hardware in place, patient has been having issues intermittently with right lower extremity, PCP noticed that patient has more swelling and redness in the lower extremity and patient has past medical history significant for diabetes this is and there is a concern for infection, patient was admitted for further management, patient was admitted with right lower extremity cellulitis, treated with IV antibiotics, IV vancomycin and IV cefepime, infectious disease was involved, patient underwent ultrasound of lower extremity did not show any DVT, blood cultures were drawn, which remain negative, underwent MRI which showed superficial soft tissue edema no abscess, for this reason patient was switched to p.o. Ceftin 500 mg twice a day and doxycycline 100 mg twice a day for 1 week on discharge, patient discharged from the hospital in stable condition. The plan was discussed in detail with patient who agreed with the plan and verbalized understanding .     The patient was right foot. 3. Degenerative changes and diffuse osteopenia as above. 4. No acute fracture or dislocation. Xr Chest Portable    Result Date: 2021  Mild pulmonary edema. Mild cardiomegaly. Mri Tibia Fibula Right W Wo Contrast    Result Date: 2021  1. Superficial soft tissue edema in the anteromedial mid right leg could represent cellulitis. 2. No abscess. No intramuscular edema.          All radiological studies reviewed      Reviews of Symptoms:    A 10 point system is reviewed and  negative except described in hospital course    Physical Exam:    Vitals:  BP (!) 136/57   Pulse 80   Temp 98.1 °F (36.7 °C) (Axillary)   Resp 18   Ht 5' 4\" (1.626 m)   Wt (!) 380 lb 9.6 oz (172.6 kg)   LMP 07/10/2017   SpO2 97%   BMI 65.33 kg/m²   Temp (24hrs), Av.8 °F (36.6 °C), Min:97.3 °F (36.3 °C), Max:98.1 °F (36.7 °C)      General appearance - alert, well appearing, and in no acute distress  Mental status - oriented to person, place, and time with normal affect  Head - normocephalic and atraumatic  Eyes - pupils equal and reactive, extraocular eye movements intact, conjunctiva clear  Ears - hearing appears to be intact  Nose - no drainage noted  Mouth - mucous membranes moist  Neck - supple, no carotid bruits, thyroid not palpable  Chest - clear to auscultation, normal effort  Heart - normal rate, regular rhythm, no murmur  Abdomen - soft, nontender, nondistended, bowel sounds present all four quadrants, no masses, hepatomegaly or splenomegaly  Neurological - normal speech, no focal findings or movement disorder noted, cranial nerves II through XII grossly intact  Extremities - peripheral pulses palpable, no pedal edema or calf pain with palpation  Skin - no gross lesions, rashes, or induration noted      Consults:  PHARMACY TO DOSE MEDICATION  IP CONSULT TO INFECTIOUS DISEASES  IP CONSULT TO PODIATRY    Disposition: Home    Discharged Condition: Stable    Follow Up: No follow-up provider lisinopril (PRINIVIL;ZESTRIL) 20 MG tablet  Take 20 mg by mouth 2 times daily             oxyCODONE-acetaminophen (PERCOCET) 5-325 MG per tablet  Take 1 tablet by mouth every 8 hours as needed for Pain.             pioglitazone (ACTOS) 30 MG tablet  Take 15 mg by mouth daily             polyethylene glycol (MIRALAX) PACK packet  Take 17 g by mouth daily             rosuvastatin (CRESTOR) 40 MG tablet  Take 40 mg by mouth daily                 Code Status:  Full Code    Time Spent on discharge is  35 mins in patient examination, evaluation, counseling as well as medication reconciliation, prescriptions for required medications, discharge plan and follow up. Electronically signed by Tiffany Ness MD on 4/5/2021 at 4:49 PM     Thank you Dr. Corin Day MD for the opportunity to be involved in this patient's care. This note was created with the assistance of a speech-recognition program.  Although the intention is to generate a document that actually reflects the content of the visit, no guarantees can be provided that every mistake has been identified and corrected by editing. Note was updated later by me after  physical examination and  completion of the assessment.

## 2021-04-05 NOTE — PROGRESS NOTES
Pt discharged to home with belongings  Discharge instructions given  \"Meds To Beds\" medication at bedside  Pt denies having any further questions at this time  Locked up home medication(s)/personal items given to patient at discharge  Patient/family state they have everything they were admitted with.

## 2021-04-05 NOTE — CARE COORDINATION
Podiatries plan is to keep pt overnight to continue IV ATB and evaluate tomorrow. Janis Huerta lead nurse will perfect serve podiatry regarding potential transfer to in network facility which would be [facility].
consult  Tyrel IV           Electronically signed by Akil Villa RN on 4/3/21 at 11:37 AM EDT

## 2021-04-05 NOTE — PLAN OF CARE
Problem: Falls - Risk of:  Goal: Will remain free from falls  Description: Will remain free from falls  4/5/2021 0155 by Ward Lucio RN  Outcome: Ongoing  Note: Room free of clutter  Hourly rounding   Non-skid socks worn  Side rails up x2  Bed low and locked  Call light in reach  Instructed to call out before getting out of bed  Anticipatory needs met  Bed alarm on  Falling star at the door      Problem: Skin Integrity:  Goal: Absence of new skin breakdown  Description: Absence of new skin breakdown  4/5/2021 0155 by Ward Lucio RN  Outcome: Ongoing  Note: Skin assessment completed and documented  Adi scale updated  Relieve pressure to bony prominences  Avoid shearing  Assess s/sx of infection   Turns side to side  Turns self  Heels elevated  Structural intactness and normal physiological function of skin and mucous membranes

## 2021-04-05 NOTE — PROGRESS NOTES
Day of Therapy:4  Current Dose:vancomycin 1500mg ivpb q12h   Culture Results           Blood:no growth x 3 days            Sputum:           Wound:           Urine: Other:  Temp Readings from Last 3 Encounters:   04/05/21 98.1 °F (36.7 °C) (Axillary)   11/30/17 98.1 °F (36.7 °C) (Oral)     Recent Labs     04/04/21  0623 04/05/21  0516   WBC 4.7 4.3     Recent Labs     04/04/21  0623 04/05/21  0516   CREATININE 0.77 0.77     Estimated Creatinine Clearance: 128 mL/min (based on SCr of 0.77 mg/dL).     Intake/Output Summary (Last 24 hours) at 4/5/2021 1152  Last data filed at 4/5/2021 0642  Gross per 24 hour   Intake 600.1 ml   Output --   Net 600.1 ml

## 2021-04-05 NOTE — PROGRESS NOTES
Occupational Therapy  Facility/Department: [facility] MED SURG  Daily Treatment Note  NAME: Jasiel Canada  : 1965  MRN: [id number]    Date of Service: 2021    Discharge Recommendations:  Home with assist PRN       Assessment   Performance deficits / Impairments: Decreased functional mobility ; Decreased endurance;Decreased ADL status; Decreased balance  Assessment: 59 y/o female admit with right LE edema/pain. Pt currently NWB on right LE and unable to ambulate while maintaining NWB on right LE. Skilled OT recommended to promote Indep with ADL. Prognosis: Good  No Skilled OT: Independent with functional mobility; Independent with ADL's;Safe to return home  REQUIRES OT FOLLOW UP: No  Activity Tolerance  Activity Tolerance: Patient Tolerated treatment well  Activity Tolerance: F+  Safety Devices  Safety Devices in place: Yes  Type of devices: Left in chair;Call light within reach         Patient Diagnosis(es): There were no encounter diagnoses. has a past medical history of Asthma, Bronchitis, CHF (congestive heart failure) ([facility] [address] COPD (chronic obstructive pulmonary disease) ([facility] [address] Diabetes mellitus ([facility] [address] H/O being hospitalized, H/O cardiac catheterization, Hypertension, LBBB (left bundle branch block), On home oxygen therapy, CECIL on CPAP, Pneumonia, SOB (shortness of breath), and Wears glasses. has a past surgical history that includes  section; Tonsillectomy; and Leg Tendon Surgery (Right, 2015).     Restrictions  Restrictions/Precautions  Restrictions/Precautions: General Precautions, Weight Bearing, Up as Tolerated  Required Braces or Orthoses?: No  Lower Extremity Weight Bearing Restrictions  Right Lower Extremity Weight Bearing: Weight Bearing As Tolerated  Subjective   General  Chart Reviewed: Yes  Patient assessed for rehabilitation services?: Yes  Response to previous treatment: Patient with no complaints from previous session  Family / Caregiver Present: Yes  Subjective  Subjective: Pt is full WB on R LE      Orientation  Orientation  Overall Orientation Status: Within Normal Limits  Objective    ADL  Toileting: Independent  Additional Comments: Pt in bathroom upon arrival toileting self independently pushing own IV pole. Pt reports having no needs and does not require assistance with any self care tasks. Balance  Sitting Balance: Independent  Standing Balance: Independent  Functional Mobility  Functional - Mobility Device: No device  Activity: To/from bathroom  Assist Level: Independent  Toilet Transfers  Toilet - Technique: Ambulating  Equipment Used: Standard toilet  Toilet Transfer: Independent     Transfers  Stand Step Transfers: Independent  Sit to stand: Independent  Stand to sit: Independent  Transfer Comments: No device used, pushes own IV pole. Cognition  Overall Cognitive Status: WNL     Perception  Overall Perceptual Status: WFL                                   Plan   Plan  Times per week: 5-6x  Times per day: Daily  Plan weeks: 1 week  Current Treatment Recommendations: Patient/Caregiver Education & Training, Balance Training, Functional Mobility Training, Endurance Training, Safety Education & Training, Self-Care / ADL             Goals  Short term goals  Time Frame for Short term goals: until d/c from facility  Short term goal 1: Further assess transfer pending any updates on right LE WB status. Short term goal 2: Pt to be Mod Indep with dressing using AD as needed. Short term goal 3: Pt to demo transfer to heavy duty commode and maintain NWB on right LE with SBA. Short term goal 4: Pt to acknowledge DME/AD to assist with NWB on right LE during ADL/transfers. Patient Goals   Patient goals : To return home.        Therapy Time   Individual Concurrent Group Co-treatment   Time In 1337         Time Out 1345         Minutes [address] [facility],[address], [place]

## 2021-04-06 ENCOUNTER — CARE COORDINATION (OUTPATIENT)
Dept: CASE MANAGEMENT | Age: 56
End: 2021-04-06

## 2021-04-06 LAB
ESTIMATED AVERAGE GLUCOSE: 186 MG/DL
HBA1C MFR BLD: 8.1 % (ref 4–6)

## 2021-04-06 NOTE — CARE COORDINATION
[facility] Transitions Initial Follow Up Call- 1st attempt COVID risk follow up call      Call within 2 business days of discharge: Yes    Patient: Sage Neff Patient : 1965   MRN: [id number]  Reason for Admission: cellulitis RLE   Discharge Date: 21 RARS: Readmission Risk Score: 13      Last Discharge [facility]       Complaint Diagnosis Description Type Department Provider    21   Admission (Discharged) Samreen Cain MD           Date/Time:  2021 1:00 PM  Attempted to reach patient by telephone. Call within 2 business days of discharge: Yes Left HIPPA compliant message requesting a return call. Will attempt to reach patient again. Follow Up  No future appointments.     Latisha Harry RN

## 2021-04-07 ENCOUNTER — CARE COORDINATION (OUTPATIENT)
Dept: CASE MANAGEMENT | Age: 56
End: 2021-04-07

## 2021-04-07 NOTE — CARE COORDINATION
[facility] Transitions Initial Follow Up Call- 2nd attempt COVID risk follow up call       Call within 2 business days of discharge: Yes    Patient: Marce Rodriges Patient : 1965   MRN: [id number]  Reason for Admission: cellulitis RLE    Discharge Date: 21 RARS: Readmission Risk Score: 13      Last Discharge [facility]       Complaint Diagnosis Description Type Department Provider    21   Admission (Discharged) Levi Fairbanks MD           Date/Time:  2021 1:22 PM  Attempted to reach patient by telephone. Call within 2 business days of discharge: Yes. 2nd attempt. Left HIPPA compliant message informing final call but can call with questions or concerns    Follow Up  No future appointments.     Florence Coyne RN

## 2021-04-08 LAB
CULTURE: NORMAL
CULTURE: NORMAL
Lab: NORMAL
Lab: NORMAL
SPECIMEN DESCRIPTION: NORMAL
SPECIMEN DESCRIPTION: NORMAL

## 2021-06-08 ENCOUNTER — TELEPHONE (OUTPATIENT)
Dept: OBGYN CLINIC | Age: 56
End: 2021-06-08

## 2021-06-09 ENCOUNTER — TELEPHONE (OUTPATIENT)
Dept: OBGYN CLINIC | Age: 56
End: 2021-06-09

## 2023-03-02 ENCOUNTER — OFFICE VISIT (OUTPATIENT)
Dept: OBGYN CLINIC | Age: 58
End: 2023-03-02

## 2023-03-02 VITALS
WEIGHT: 293 LBS | SYSTOLIC BLOOD PRESSURE: 124 MMHG | DIASTOLIC BLOOD PRESSURE: 84 MMHG | HEART RATE: 93 BPM | HEIGHT: 64 IN | BODY MASS INDEX: 50.02 KG/M2

## 2023-03-02 DIAGNOSIS — Z12.31 SCREENING MAMMOGRAM FOR BREAST CANCER: ICD-10-CM

## 2023-03-02 DIAGNOSIS — Z01.419 WELL WOMAN EXAM: Primary | ICD-10-CM

## 2023-03-02 DIAGNOSIS — B37.2 SKIN YEAST INFECTION: ICD-10-CM

## 2023-03-02 RX ORDER — FLUCONAZOLE 150 MG/1
TABLET ORAL
Qty: 3 TABLET | Refills: 2 | Status: SHIPPED | OUTPATIENT
Start: 2023-03-02

## 2023-03-02 RX ORDER — NYSTATIN 100000 [USP'U]/G
POWDER TOPICAL
Qty: 60 G | Refills: 4 | Status: SHIPPED | OUTPATIENT
Start: 2023-03-02

## 2023-03-02 SDOH — ECONOMIC STABILITY: INCOME INSECURITY: HOW HARD IS IT FOR YOU TO PAY FOR THE VERY BASICS LIKE FOOD, HOUSING, MEDICAL CARE, AND HEATING?: NOT HARD AT ALL

## 2023-03-02 SDOH — ECONOMIC STABILITY: FOOD INSECURITY: WITHIN THE PAST 12 MONTHS, THE FOOD YOU BOUGHT JUST DIDN'T LAST AND YOU DIDN'T HAVE MONEY TO GET MORE.: NEVER TRUE

## 2023-03-02 SDOH — ECONOMIC STABILITY: FOOD INSECURITY: WITHIN THE PAST 12 MONTHS, YOU WORRIED THAT YOUR FOOD WOULD RUN OUT BEFORE YOU GOT MONEY TO BUY MORE.: NEVER TRUE

## 2023-03-02 SDOH — ECONOMIC STABILITY: HOUSING INSECURITY
IN THE LAST 12 MONTHS, WAS THERE A TIME WHEN YOU DID NOT HAVE A STEADY PLACE TO SLEEP OR SLEPT IN A SHELTER (INCLUDING NOW)?: NO

## 2023-03-02 ASSESSMENT — PATIENT HEALTH QUESTIONNAIRE - PHQ9
SUM OF ALL RESPONSES TO PHQ QUESTIONS 1-9: 0
SUM OF ALL RESPONSES TO PHQ9 QUESTIONS 1 & 2: 0
SUM OF ALL RESPONSES TO PHQ QUESTIONS 1-9: 0
1. LITTLE INTEREST OR PLEASURE IN DOING THINGS: 0
SUM OF ALL RESPONSES TO PHQ QUESTIONS 1-9: 0
2. FEELING DOWN, DEPRESSED OR HOPELESS: 0
SUM OF ALL RESPONSES TO PHQ QUESTIONS 1-9: 0

## 2023-03-02 NOTE — PROGRESS NOTES
[facility] GYN  [address] [facility] [address]  Dept: [phone]        DATE OF VISIT:  3/2/23        History and Physical    Elaine Monk    :  1965  CHIEF COMPLAINT:    Chief Complaint   Patient presents with    Annual Exam                    Elaine Monk is a 59 y.o. female who presents for annual well woman exam.  Patient reports that she has yeast infection under her breasts. Patient did have a cream that she was using but it did not go completely away and the cream is now gone. The patient was seen and examined. Per the patient bowels are regular. She has no voiding complaints. She denies any bloating as well as vaginal discharge. Chaperone for Intimate Exam  Chaperone was offered and accepted as part of the rooming process. Chaperone: Roderick Red     _____________________________________________________________________  Past Medical History:   Diagnosis Date    Asthma     Bronchitis     hx of    CHF (congestive heart failure) ([address]     COPD (chronic obstructive pulmonary disease) ([facility] [address]     Diabetes mellitus ([facility] [address]     H/O being hospitalized 2017    for approx. 1 week with asthma.     H/O cardiac catheterization 10/2014    no stents OP at [address] Last visit 2015     Hypertension     LBBB (left bundle branch block) 2017    On home oxygen therapy     3 liters hooked into the CPAP machine    CECIL on CPAP     uses nightly    Pneumonia     SOB (shortness of breath)     HX OF    Wears glasses                                                                    Past Surgical History:   Procedure Laterality Date     SECTION      x4    LEG TENDON SURGERY Right 2015    was' in ICU AFTER SURGERY FOR FEW DAYS AFTER DISCHARGED FROM HOSP IN , TO EXTENDED CARE FACILITY DUE TO COMPLICATIONS\"    TONSILLECTOMY       Family History   Problem Relation Age of Onset    Diabetes Mother Hypertension Mother     Kidney Disease Mother     Asthma Father      Social History     Tobacco Use   Smoking Status Every Day    Packs/day: 0.50    Years: 20.00    Pack years: 10.00    Types: Cigarettes   Smokeless Tobacco Never     Social History     Substance and Sexual Activity   Alcohol Use No     Current Outpatient Medications   Medication Sig Dispense Refill    nystatin (MYCOSTATIN) 653137 UNIT/GM powder Apply 3 times daily. 60 g 4    fluconazole (DIFLUCAN) 150 MG tablet Take one tablet today and repeat one tablet every 3 days. 3 tablet 2    pioglitazone (ACTOS) 30 MG tablet Take 15 mg by mouth daily      carvedilol (COREG) 12.5 MG tablet Take 12.5 mg by mouth 2 times daily (with meals)      allopurinol (ZYLOPRIM) 100 MG tablet Take 100 mg by mouth daily      colchicine (COLCRYS) 0.6 MG tablet Take 0.6 mg by mouth daily      furosemide (LASIX) 20 MG tablet Take 20 mg by mouth 2 times daily      rosuvastatin (CRESTOR) 40 MG tablet Take 40 mg by mouth daily      oxyCODONE-acetaminophen (PERCOCET) 5-325 MG per tablet Take 1 tablet by mouth every 8 hours as needed for Pain. insulin glargine (LANTUS) 100 UNIT/ML injection vial Inject 20 Units into the skin every morning (before breakfast)       Liraglutide (VICTOZA) 18 MG/3ML SOPN SC injection Inject 1.2 mg into the skin daily      ipratropium-albuterol (DUONEB) 0.5-2.5 (3) MG/3ML SOLN nebulizer solution Inhale 3 mLs into the lungs every 6 hours as needed for Shortness of Breath 360 mL 0    polyethylene glycol (MIRALAX) PACK packet Take 17 g by mouth daily      lisinopril (PRINIVIL;ZESTRIL) 20 MG tablet Take 20 mg by mouth 2 times daily       No current facility-administered medications for this visit. Allergies:   Allergies   Allergen Reactions    Ipratropium-Albuterol      Patient states she gets tremors    Morphine Other (See Comments)     Other reaction(s): Confusion  Pt was difficult to arouse when she received morphine after surgery     Metformin Anxiety       Gynecologic History:  Patient's last menstrual period was 07/10/2017. Sexually Active: No  STD History:No  Birth Control: No    OB History    Para Term  AB Living   4 4 0 0 0 4   SAB IAB Ectopic Molar Multiple Live Births   0 0 0 0 0 0     ______________________________________________________________________    Review of Systems    REVIEW OFSYSTEMS:        Constitutional:  Unexpected weight change 10 lb gain over the past sevearl month, extreme fatigue, night sweats              yes  Skin:                           Rashes yeast under breasts, moles   yes  Neurological:  Frequentheadaches, seizures         no  Ophthalmic:  Recent visual changes no  ENT:   Difficulty swallowing  no  Breast:              Masses, pain, nipple discharge                            no     Respiratory:  Shortness of breath, coughing           no    Cardiovascular: Chest pain   no     Gastrointestinal: Chronic diarrhea/constipation, nausea/vomiting           no   Urogenital:  Urinary incontinence, frequency, urgency          no                                         Heavy/irregular periods           no                                      Vaginal discharge                   no  Hematological: Bruises easy   no     Endocrine:  Hot flashes   no     Hot/Cold Intolerance  no    Psychological:            Mood and affect were within normal limits. yes                 Physical Exam    Physical Exam:    Vitals:    23 0914   BP: 124/84   Pulse: 93   Weight: (!) 360 lb (163.3 kg)   Height: 5' 4\" (1.626 m)       General Appearance: This  is a well developed, well nourished, well groomed female. Her BMI was reviewed. Nutritional decision making andexercise were discussed. Neurological:  The patient is alert and oriented to time,place, person, and situation    Skin:  A brief inspection of the skin revealed no rashes or lesions. Yeast noted at umbilicus and under pannus. Neck:  The neck was supple. Respiratory: There was unlabored respiratory effort. Lungs clear to ascultation. Cardiovascular: The patients extremities were without calf tenderness or edema. Heart with a regular rate and rhythm. Abdomen: The abdomen was soft and non-tender with no guarding, rebound or rigidity. No hernias were appreciated. Breast:   The patients breasts were symmetrical.  There were no masses, discharge or retractions noted. Yeast under both breasts. Self breast exams were reviewed. Pelvic Exam:  The external genitalia was with a normal appearance. Urinary System:  urethral meatus normal    The vaginal vault was normal. There were no cystocele, rectocele, or enterocele appreciated. There was no vaginal discharge. The cervix was without lesions. There was no cervical motion tenderness. The uterus was mobile, midline and regular. The adnexa no fullness, tenderness or masses appreciated. Rectal exam:  declined           ASSESSMENT: Normal annual well woman exam with skin yeast    59 y.o. Female; Annual   Diagnosis Orders   1. Well woman exam        2. Screening mammogram for breast cancer  MELIA DIGITAL SCREEN W OR WO CAD BILATERAL      3. Skin yeast infection  nystatin (MYCOSTATIN) 441651 UNIT/GM powder    fluconazole (DIFLUCAN) 150 MG tablet                    PLAN:  - Discussed new papsmear guidelines. - Discussed yeast and using nystatin powder along with diflucan to clear yeast and keeping BS under control to help with controlling yeast.   - Smoking risk factors Discussed  - Diet and exercise reviewed. - Routine healthmaintenance per patients PCP.  - Return to clinic in 1 year or earlier with questions, problems, concerns. Return for 1 year for Annual and as needed.         Electronically signed by SHELLI Lazar CNP on 3/2/2023 at 9:48 AM

## 2024-08-01 ENCOUNTER — HOSPITAL ENCOUNTER (OUTPATIENT)
Age: 59
Setting detail: SPECIMEN
Discharge: HOME | End: 2024-08-01

## 2024-08-01 ENCOUNTER — OFFICE VISIT (OUTPATIENT)
Dept: OBGYN CLINIC | Age: 59
End: 2024-08-01
Payer: COMMERCIAL

## 2024-08-01 VITALS
HEART RATE: 91 BPM | WEIGHT: 293 LBS | BODY MASS INDEX: 50.02 KG/M2 | HEIGHT: 64 IN | SYSTOLIC BLOOD PRESSURE: 136 MMHG | DIASTOLIC BLOOD PRESSURE: 84 MMHG

## 2024-08-01 DIAGNOSIS — Z12.11 COLON CANCER SCREENING: ICD-10-CM

## 2024-08-01 DIAGNOSIS — Z01.419 WELL WOMAN EXAM: Primary | ICD-10-CM

## 2024-08-01 DIAGNOSIS — Z78.0 POSTMENOPAUSAL: ICD-10-CM

## 2024-08-01 DIAGNOSIS — F17.200 SMOKER: ICD-10-CM

## 2024-08-01 DIAGNOSIS — Z11.51 SCREENING FOR HUMAN PAPILLOMAVIRUS (HPV): ICD-10-CM

## 2024-08-01 DIAGNOSIS — Z12.31 SCREENING MAMMOGRAM FOR BREAST CANCER: ICD-10-CM

## 2024-08-01 PROCEDURE — G0101 CA SCREEN;PELVIC/BREAST EXAM: HCPCS | Performed by: CLINICAL NURSE SPECIALIST

## 2024-08-01 RX ORDER — SEMAGLUTIDE 1.34 MG/ML
INJECTION, SOLUTION SUBCUTANEOUS
COMMUNITY
Start: 2024-07-31

## 2024-08-01 SDOH — ECONOMIC STABILITY: INCOME INSECURITY: HOW HARD IS IT FOR YOU TO PAY FOR THE VERY BASICS LIKE FOOD, HOUSING, MEDICAL CARE, AND HEATING?: NOT HARD AT ALL

## 2024-08-01 SDOH — ECONOMIC STABILITY: FOOD INSECURITY: WITHIN THE PAST 12 MONTHS, THE FOOD YOU BOUGHT JUST DIDN'T LAST AND YOU DIDN'T HAVE MONEY TO GET MORE.: NEVER TRUE

## 2024-08-01 SDOH — ECONOMIC STABILITY: FOOD INSECURITY: WITHIN THE PAST 12 MONTHS, YOU WORRIED THAT YOUR FOOD WOULD RUN OUT BEFORE YOU GOT MONEY TO BUY MORE.: NEVER TRUE

## 2024-08-01 ASSESSMENT — PATIENT HEALTH QUESTIONNAIRE - PHQ9
1. LITTLE INTEREST OR PLEASURE IN DOING THINGS: NOT AT ALL
SUM OF ALL RESPONSES TO PHQ9 QUESTIONS 1 & 2: 0
SUM OF ALL RESPONSES TO PHQ QUESTIONS 1-9: 0
2. FEELING DOWN, DEPRESSED OR HOPELESS: NOT AT ALL

## 2024-08-01 NOTE — PROGRESS NOTES
[facility], [facility] OBSTETRICS & GYNECOLOGY  [address]  [place] [address]  Dept: [phone]        DATE OF VISIT:  24        History and Physical    Shantal Lauren    :  1965  CHIEF COMPLAINT:    Chief Complaint   Patient presents with    Annual Exam                    Shantal Lauren is a 59 y.o. female who presents for annual well woman exam.      The patient was seen and examined.  Per the patient bowels are regular. She has no voiding complaints.  She denies any bloating as well as vaginal discharge.    Chaperone for Intimate Exam  Chaperone was offered as part of the rooming process. Patient declined and agrees to continue with exam without a chaperone.  Chaperone: none     _____________________________________________________________________  Past Medical History:   Diagnosis Date    Asthma     Bronchitis     hx of    CHF (congestive heart failure) ([facility])     COPD (chronic obstructive pulmonary disease) ([facility])     Diabetes mellitus ([facility])     H/O being hospitalized 2017    for approx. 1 week with asthma.    H/O cardiac catheterization 10/2014    no stents OP at [facility] [address] Last visit 2015     Hypertension     LBBB (left bundle branch block) 2017    On home oxygen therapy     3 liters hooked into the CPAP machine    CECIL on CPAP     uses nightly    Pneumonia     SOB (shortness of breath)     HX OF    Wears glasses                                                                    Past Surgical History:   Procedure Laterality Date     SECTION      x4    LEG TENDON SURGERY Right 2015    was' in ICU AFTER SURGERY FOR FEW DAYS AFTER DISCHARGED FROM HOSP IN , TO EXTENDED CARE FACILITY DUE TO COMPLICATIONS\"    TONSILLECTOMY       Family History   Problem Relation Age of Onset    Diabetes Mother     Hypertension Mother     Kidney Disease Mother     Asthma Father      Social History     Tobacco Use

## 2024-08-03 LAB
HPV I/H RISK 4 DNA CVX QL NAA+PROBE: NOT DETECTED
HPV SAMPLE: NORMAL
HPV, INTERPRETATION: NORMAL
HPV16 DNA CVX QL NAA+PROBE: NOT DETECTED
HPV18 DNA CVX QL NAA+PROBE: NOT DETECTED
SPECIMEN DESCRIPTION: NORMAL

## 2024-08-09 LAB — CYTOLOGY REPORT: NORMAL

## 2025-05-04 ENCOUNTER — OFFICE VISIT (OUTPATIENT)
Age: 60
End: 2025-05-04

## 2025-05-04 VITALS
RESPIRATION RATE: 18 BRPM | SYSTOLIC BLOOD PRESSURE: 104 MMHG | TEMPERATURE: 97.6 F | BODY MASS INDEX: 50.02 KG/M2 | DIASTOLIC BLOOD PRESSURE: 66 MMHG | OXYGEN SATURATION: 95 % | HEIGHT: 64 IN | WEIGHT: 293 LBS | HEART RATE: 63 BPM

## 2025-05-04 DIAGNOSIS — M43.6 TORTICOLLIS: Primary | ICD-10-CM

## 2025-05-04 RX ORDER — INSULIN GLARGINE 300 U/ML
INJECTION, SOLUTION SUBCUTANEOUS
COMMUNITY
Start: 2025-02-25

## 2025-05-04 RX ORDER — LANCETS 33 GAUGE
EACH MISCELLANEOUS
COMMUNITY
Start: 2025-03-18

## 2025-05-04 RX ORDER — FLURBIPROFEN SODIUM 0.3 MG/ML
SOLUTION/ DROPS OPHTHALMIC
COMMUNITY
Start: 2025-02-25

## 2025-05-04 RX ORDER — CARVEDILOL 25 MG/1
TABLET ORAL
COMMUNITY
Start: 2025-04-07

## 2025-05-04 RX ORDER — INSULIN LISPRO 100 [IU]/ML
INJECTION, SOLUTION INTRAVENOUS; SUBCUTANEOUS
COMMUNITY
Start: 2025-03-24

## 2025-05-04 RX ORDER — ORAL SEMAGLUTIDE 3 MG/1
TABLET ORAL
COMMUNITY
Start: 2025-02-21

## 2025-05-04 RX ORDER — ACARBOSE 50 MG/1
TABLET ORAL
COMMUNITY
Start: 2025-03-04

## 2025-05-04 RX ORDER — AMLODIPINE BESYLATE 10 MG/1
TABLET ORAL
COMMUNITY
Start: 2025-04-07

## 2025-05-04 RX ORDER — SPIRONOLACTONE 25 MG/1
TABLET ORAL
COMMUNITY
Start: 2025-04-07

## 2025-05-04 RX ORDER — FLUTICASONE FUROATE, UMECLIDINIUM BROMIDE AND VILANTEROL TRIFENATATE 100; 62.5; 25 UG/1; UG/1; UG/1
POWDER RESPIRATORY (INHALATION)
COMMUNITY
Start: 2025-02-21

## 2025-05-04 RX ORDER — LISINOPRIL 40 MG/1
TABLET ORAL
COMMUNITY
Start: 2025-02-26

## 2025-05-04 RX ORDER — PEN NEEDLE, DIABETIC 32GX 5/32"
NEEDLE, DISPOSABLE MISCELLANEOUS
COMMUNITY
Start: 2025-03-24

## 2025-05-04 RX ORDER — FLUTICASONE PROPIONATE 50 MCG
SPRAY, SUSPENSION (ML) NASAL
COMMUNITY
Start: 2025-02-21

## 2025-05-04 RX ORDER — CYCLOBENZAPRINE HCL 10 MG
10 TABLET ORAL 3 TIMES DAILY PRN
Qty: 21 TABLET | Refills: 0 | Status: SHIPPED | OUTPATIENT
Start: 2025-05-04 | End: 2025-05-14

## 2025-05-04 RX ORDER — ORAL SEMAGLUTIDE 7 MG/1
TABLET ORAL
COMMUNITY
Start: 2025-03-18

## 2025-06-05 ENCOUNTER — APPOINTMENT (OUTPATIENT)
Dept: CT IMAGING | Facility: CLINIC | Age: 60
End: 2025-06-05
Payer: COMMERCIAL

## 2025-06-05 ENCOUNTER — APPOINTMENT (OUTPATIENT)
Dept: GENERAL RADIOLOGY | Facility: CLINIC | Age: 60
End: 2025-06-05
Payer: COMMERCIAL

## 2025-06-05 ENCOUNTER — HOSPITAL ENCOUNTER (EMERGENCY)
Facility: CLINIC | Age: 60
Discharge: HOME | End: 2025-06-05
Attending: EMERGENCY MEDICINE
Payer: COMMERCIAL

## 2025-06-05 VITALS
HEIGHT: 64 IN | WEIGHT: 293 LBS | HEART RATE: 75 BPM | BODY MASS INDEX: 50.02 KG/M2 | SYSTOLIC BLOOD PRESSURE: 167 MMHG | RESPIRATION RATE: 14 BRPM | OXYGEN SATURATION: 98 % | DIASTOLIC BLOOD PRESSURE: 83 MMHG | TEMPERATURE: 97.9 F

## 2025-06-05 DIAGNOSIS — M43.6 TORTICOLLIS: Primary | ICD-10-CM

## 2025-06-05 DIAGNOSIS — R51.9 ACUTE NONINTRACTABLE HEADACHE, UNSPECIFIED HEADACHE TYPE: ICD-10-CM

## 2025-06-05 LAB
ALBUMIN SERPL-MCNC: 4.3 G/DL (ref 3.5–5.2)
ALBUMIN/GLOB SERPL: 1 {RATIO}
ALP SERPL-CCNC: 92 U/L (ref 35–104)
ALT SERPL-CCNC: 12 U/L (ref 10–35)
ANION GAP SERPL CALCULATED.3IONS-SCNC: 10 MMOL/L (ref 9–16)
AST SERPL-CCNC: 15 U/L (ref 10–35)
BASOPHILS # BLD: 0 K/UL (ref 0–0.2)
BASOPHILS NFR BLD: 1 % (ref 0–2)
BILIRUB SERPL-MCNC: 0.6 MG/DL (ref 0–1.2)
BUN SERPL-MCNC: 13 MG/DL (ref 8–23)
CALCIUM SERPL-MCNC: 9.8 MG/DL (ref 8.6–10.4)
CHLORIDE SERPL-SCNC: 100 MMOL/L (ref 98–107)
CO2 SERPL-SCNC: 30 MMOL/L (ref 20–31)
CREAT SERPL-MCNC: 0.8 MG/DL (ref 0.5–0.9)
EOSINOPHIL # BLD: 0.1 K/UL (ref 0–0.4)
EOSINOPHILS RELATIVE PERCENT: 2 % (ref 1–4)
ERYTHROCYTE [DISTWIDTH] IN BLOOD BY AUTOMATED COUNT: 15.3 % (ref 12.5–15.4)
GFR, ESTIMATED: 83 ML/MIN/1.73M2
GLUCOSE SERPL-MCNC: 152 MG/DL (ref 74–99)
HCT VFR BLD AUTO: 45.2 % (ref 36–46)
HGB BLD-MCNC: 14.9 G/DL (ref 12–16)
LYMPHOCYTES NFR BLD: 2.1 K/UL (ref 1–4.8)
LYMPHOCYTES RELATIVE PERCENT: 45 % (ref 24–44)
MCH RBC QN AUTO: 29.6 PG (ref 26–34)
MCHC RBC AUTO-ENTMCNC: 33 G/DL (ref 31–37)
MCV RBC AUTO: 89.7 FL (ref 80–100)
MONOCYTES NFR BLD: 0.4 K/UL (ref 0.1–1.2)
MONOCYTES NFR BLD: 9 % (ref 2–11)
NEUTROPHILS NFR BLD: 43 % (ref 36–66)
NEUTS SEG NFR BLD: 2 K/UL (ref 1.8–7.7)
PLATELET # BLD AUTO: 207 K/UL (ref 140–450)
PMV BLD AUTO: 8.2 FL (ref 6–12)
POTASSIUM SERPL-SCNC: 4.1 MMOL/L (ref 3.7–5.3)
PROT SERPL-MCNC: 8.5 G/DL (ref 6.6–8.7)
RBC # BLD AUTO: 5.04 M/UL (ref 4–5.2)
SODIUM SERPL-SCNC: 140 MMOL/L (ref 136–145)
TROPONIN I SERPL HS-MCNC: 13 NG/L (ref 0–14)
TROPONIN I SERPL HS-MCNC: 13 NG/L (ref 0–14)
WBC OTHER # BLD: 4.7 K/UL (ref 3.5–11)

## 2025-06-05 PROCEDURE — 71045 X-RAY EXAM CHEST 1 VIEW: CPT

## 2025-06-05 PROCEDURE — 2500000003 HC RX 250 WO HCPCS

## 2025-06-05 PROCEDURE — 36415 COLL VENOUS BLD VENIPUNCTURE: CPT

## 2025-06-05 PROCEDURE — 93005 ELECTROCARDIOGRAM TRACING: CPT

## 2025-06-05 PROCEDURE — 70498 CT ANGIOGRAPHY NECK: CPT

## 2025-06-05 PROCEDURE — 84484 ASSAY OF TROPONIN QUANT: CPT

## 2025-06-05 PROCEDURE — 6360000004 HC RX CONTRAST MEDICATION

## 2025-06-05 PROCEDURE — 96372 THER/PROPH/DIAG INJ SC/IM: CPT

## 2025-06-05 PROCEDURE — 96374 THER/PROPH/DIAG INJ IV PUSH: CPT

## 2025-06-05 PROCEDURE — 72125 CT NECK SPINE W/O DYE: CPT

## 2025-06-05 PROCEDURE — 99285 EMERGENCY DEPT VISIT HI MDM: CPT

## 2025-06-05 PROCEDURE — 85025 COMPLETE CBC W/AUTO DIFF WBC: CPT

## 2025-06-05 PROCEDURE — 6360000002 HC RX W HCPCS

## 2025-06-05 PROCEDURE — 6370000000 HC RX 637 (ALT 250 FOR IP)

## 2025-06-05 PROCEDURE — 80053 COMPREHEN METABOLIC PANEL: CPT

## 2025-06-05 RX ORDER — LIDOCAINE 4 G/G
1 PATCH TOPICAL ONCE
Status: DISCONTINUED | OUTPATIENT
Start: 2025-06-05 | End: 2025-06-05 | Stop reason: HOSPADM

## 2025-06-05 RX ORDER — SODIUM CHLORIDE 0.9 % (FLUSH) 0.9 %
10 SYRINGE (ML) INJECTION PRN
Status: DISCONTINUED | OUTPATIENT
Start: 2025-06-05 | End: 2025-06-05 | Stop reason: HOSPADM

## 2025-06-05 RX ORDER — ORPHENADRINE CITRATE 30 MG/ML
60 INJECTION INTRAMUSCULAR; INTRAVENOUS ONCE
Status: COMPLETED | OUTPATIENT
Start: 2025-06-05 | End: 2025-06-05

## 2025-06-05 RX ORDER — CYCLOBENZAPRINE HCL 10 MG
10 TABLET ORAL 3 TIMES DAILY PRN
Qty: 21 TABLET | Refills: 0 | Status: SHIPPED | OUTPATIENT
Start: 2025-06-05 | End: 2025-06-15

## 2025-06-05 RX ORDER — 0.9 % SODIUM CHLORIDE 0.9 %
80 INTRAVENOUS SOLUTION INTRAVENOUS ONCE
Status: DISCONTINUED | OUTPATIENT
Start: 2025-06-05 | End: 2025-06-05 | Stop reason: HOSPADM

## 2025-06-05 RX ORDER — KETOROLAC TROMETHAMINE 15 MG/ML
15 INJECTION, SOLUTION INTRAMUSCULAR; INTRAVENOUS ONCE
Status: COMPLETED | OUTPATIENT
Start: 2025-06-05 | End: 2025-06-05

## 2025-06-05 RX ORDER — IOPAMIDOL 755 MG/ML
100 INJECTION, SOLUTION INTRAVASCULAR
Status: COMPLETED | OUTPATIENT
Start: 2025-06-05 | End: 2025-06-05

## 2025-06-05 RX ORDER — CYCLOBENZAPRINE HCL 10 MG
10 TABLET ORAL ONCE
Status: COMPLETED | OUTPATIENT
Start: 2025-06-05 | End: 2025-06-05

## 2025-06-05 RX ADMIN — ORPHENADRINE CITRATE 60 MG: 60 INJECTION INTRAMUSCULAR; INTRAVENOUS at 16:51

## 2025-06-05 RX ADMIN — IOPAMIDOL 100 ML: 755 INJECTION, SOLUTION INTRAVENOUS at 17:55

## 2025-06-05 RX ADMIN — Medication 80 ML: at 17:55

## 2025-06-05 RX ADMIN — SODIUM CHLORIDE, PRESERVATIVE FREE 10 ML: 5 INJECTION INTRAVENOUS at 17:55

## 2025-06-05 RX ADMIN — KETOROLAC TROMETHAMINE 15 MG: 15 INJECTION, SOLUTION INTRAMUSCULAR; INTRAVENOUS at 16:51

## 2025-06-05 RX ADMIN — CYCLOBENZAPRINE 10 MG: 10 TABLET, FILM COATED ORAL at 19:08

## 2025-06-05 ASSESSMENT — PAIN SCALES - GENERAL
PAINLEVEL_OUTOF10: 9
PAINLEVEL_OUTOF10: 5
PAINLEVEL_OUTOF10: 5

## 2025-06-05 ASSESSMENT — PAIN DESCRIPTION - LOCATION
LOCATION: NECK;HEAD

## 2025-06-05 ASSESSMENT — LIFESTYLE VARIABLES
HOW OFTEN DO YOU HAVE A DRINK CONTAINING ALCOHOL: NEVER
HOW MANY STANDARD DRINKS CONTAINING ALCOHOL DO YOU HAVE ON A TYPICAL DAY: PATIENT DOES NOT DRINK

## 2025-06-05 ASSESSMENT — PAIN - FUNCTIONAL ASSESSMENT: PAIN_FUNCTIONAL_ASSESSMENT: 0-10

## 2025-06-05 ASSESSMENT — PAIN DESCRIPTION - DESCRIPTORS
DESCRIPTORS: THROBBING;ACHING
DESCRIPTORS: THROBBING
DESCRIPTORS: THROBBING

## 2025-06-05 ASSESSMENT — PAIN DESCRIPTION - ORIENTATION
ORIENTATION: LEFT

## 2025-06-05 ASSESSMENT — PAIN DESCRIPTION - PAIN TYPE
TYPE: ACUTE PAIN
TYPE: ACUTE PAIN

## 2025-06-05 NOTE — DISCHARGE INSTRUCTIONS
Please call and schedule a follow up appointment with your PCP in the next 2-3 days. Take your prescriptions as directed.     Take the muscle relaxer as prescribed.  I would hold taking any Percocet if you are also taking the muscle relaxers as well with COVID potentiate each other and increase drowsiness.    PLEASE RETURN TO THE EMERGENCY DEPARTMENT IMMEDIATELY for worsening symptoms, or if you develop any concerning symptoms such as: high fever not relieved by acetaminophen (Tylenol) and/or ibuprofen (Motrin), chills, shortness of breath, chest pain, persistent nausea and/or vomiting, numbness, weakness or tingling in the arms or legs or change in color of the extremities, changes in mental status, persistent headache, blurry vision.

## 2025-06-05 NOTE — ED PROVIDER NOTES
ADDENDUM:      Care of this patient was assumed from Dr. Kurtz.  The patient was seen for Neck Pain and Headache (Left-sided neck pain that radiates to the left side of her head. )  .  The patient's initial evaluation and plan have been discussed with the prior provider who initially evaluated the patient.  Nursing Notes, Past Medical Hx, Past Surgical Hx, Social Hx, Allergies, and Family Hx were all reviewed.      Diagnostic Results       RADIOLOGY:   Non-plain film images such as CT, Ultrasound and MRI are read by the radiologist. Plain radiographic images are visualized and the radiologist interpretations are reviewed as follows:     CT CERVICAL SPINE WO CONTRAST  Result Date: 6/5/2025  EXAMINATION: ONE XRAY VIEW OF THE CHEST; CT OF THE CERVICAL SPINE WITHOUT CONTRAST 6/5/2025 5:52 pm; 6/5/2025 5:33 pm TECHNIQUE: CT of the cervical spine was performed without the administration of intravenous contrast. Multiplanar reformatted images are provided for review. Automated exposure control, iterative reconstruction, and/or weight based adjustment of the mA/kV was utilized to reduce the radiation dose to as low as reasonably achievable. COMPARISON: None. HISTORY: ORDERING SYSTEM PROVIDED HISTORY: hypertnesion TECHNOLOGIST PROVIDED HISTORY: hypertnesion Reason for Exam: hypertension; ORDERING SYSTEM PROVIDED HISTORY: lateral neck pain TECHNOLOGIST PROVIDED HISTORY: lateral neck pain Decision Support Exception - unselect if not a suspected or confirmed emergency medical condition->Emergency Medical Condition (MA) Reason for Exam: left lateral neck pain FINDINGS: CHEST X-RAY: Mild congestion, but no focal infiltrates.  No pulmonary edema.  Heart, mediastinum and pleural surfaces appear normal. CT C-SPINE: BONES/ALIGNMENT: There is no acute fracture or traumatic malalignment. DEGENERATIVE CHANGES: No severe osseous spinal canal stenosis. SOFT TISSUES: There is no prevertebral soft tissue swelling.     1. Mild congestion

## 2025-06-05 NOTE — ED PROVIDER NOTES
[facility]  [address] [address]  Phone: [phone]      Patient Name:  Shantal Lauren  Medical Record Number:  [id number]  YOB: 1965  Date of Service:  6/5/2025  Primary Care Physician:  Travis Dyer MD      CHIEF COMPLAINT:       Chief Complaint   Patient presents with    Neck Pain    Headache     Left-sided neck pain that radiates to the left side of her head.        HISTORY OF PRESENT ILLNESS:    Shantal Lauren is a 60 y.o. female who presents with the complaint of a 2-week history of left lateral back pain that extends up to the left side of the occipital region of the head.  She reports that 2 days ago she began to have some left eye blurriness.  No other neurological symptoms.  Was recently seen and evaluated in urgent care for this diagnosed with torticollis prescribed some muscle relaxers which she reports did help.  She did go to her pain management specialist who had advised her to get a x-ray of her cervical spine.  She reported that the pain had gone away so she did not get the x-ray that he had ordered.  Presents today with return of the pain.  No chest pain or shortness of breath no other neurological symptoms no nausea no vomiting no constipation no diarrhea no lower urinary tract symptoms.    CURRENT MEDICATIONS:      Current Discharge Medication List        CONTINUE these medications which have NOT CHANGED    Details   acarbose (PRECOSE) 50 MG tablet       amLODIPine (NORVASC) 10 MG tablet       fluticasone (FLONASE) 50 MCG/ACT nasal spray       TRELEGY ELLIPTA 100-62.5-25 MCG/ACT AEPB inhaler       HUMALOG KWIKPEN 100 UNIT/ML SOPN       !! B-D UF III MINI PEN NEEDLES 31G X 5 MM MISC       !! SURE COMFORT PEN NEEDLES 32G X 4 MM MISC       Lancets (ONETOUCH DELICA PLUS [id number]) MISC       spironolactone (ALDACTONE) 25 MG tablet       !! carvedilol (COREG) 25 MG tablet       TOUJEO MAX SOLOSTAR 300 UNIT/ML concentrated injection pen       !!

## 2025-06-07 LAB
EKG ATRIAL RATE: 82 BPM
EKG P AXIS: 60 DEGREES
EKG P-R INTERVAL: 178 MS
EKG Q-T INTERVAL: 366 MS
EKG QRS DURATION: 82 MS
EKG QTC CALCULATION (BAZETT): 427 MS
EKG R AXIS: -23 DEGREES
EKG T AXIS: 70 DEGREES
EKG VENTRICULAR RATE: 82 BPM

## 2025-08-13 ENCOUNTER — OFFICE VISIT (OUTPATIENT)
Dept: OBGYN CLINIC | Age: 60
End: 2025-08-13
Payer: MEDICARE

## 2025-08-13 VITALS
HEART RATE: 78 BPM | WEIGHT: 293 LBS | SYSTOLIC BLOOD PRESSURE: 142 MMHG | HEIGHT: 64 IN | BODY MASS INDEX: 50.02 KG/M2 | DIASTOLIC BLOOD PRESSURE: 90 MMHG

## 2025-08-13 DIAGNOSIS — Z13.820 SCREENING FOR OSTEOPOROSIS: ICD-10-CM

## 2025-08-13 DIAGNOSIS — F17.200 SMOKER: ICD-10-CM

## 2025-08-13 DIAGNOSIS — B37.2 SKIN YEAST INFECTION: ICD-10-CM

## 2025-08-13 DIAGNOSIS — Z12.31 SCREENING MAMMOGRAM FOR BREAST CANCER: ICD-10-CM

## 2025-08-13 DIAGNOSIS — R03.0 ELEVATED BLOOD PRESSURE READING: ICD-10-CM

## 2025-08-13 DIAGNOSIS — Z01.419 WELL WOMAN EXAM: Primary | ICD-10-CM

## 2025-08-13 DIAGNOSIS — Z78.0 POSTMENOPAUSAL: ICD-10-CM

## 2025-08-13 DIAGNOSIS — R21 EXCORIATED RASH: ICD-10-CM

## 2025-08-13 PROCEDURE — 99213 OFFICE O/P EST LOW 20 MIN: CPT | Performed by: CLINICAL NURSE SPECIALIST

## 2025-08-13 PROCEDURE — 3017F COLORECTAL CA SCREEN DOC REV: CPT | Performed by: CLINICAL NURSE SPECIALIST

## 2025-08-13 PROCEDURE — 4004F PT TOBACCO SCREEN RCVD TLK: CPT | Performed by: CLINICAL NURSE SPECIALIST

## 2025-08-13 PROCEDURE — G0101 CA SCREEN;PELVIC/BREAST EXAM: HCPCS | Performed by: CLINICAL NURSE SPECIALIST

## 2025-08-13 PROCEDURE — G8417 CALC BMI ABV UP PARAM F/U: HCPCS | Performed by: CLINICAL NURSE SPECIALIST

## 2025-08-13 PROCEDURE — G8427 DOCREV CUR MEDS BY ELIG CLIN: HCPCS | Performed by: CLINICAL NURSE SPECIALIST

## 2025-08-13 RX ORDER — FLUCONAZOLE 100 MG/1
100 TABLET ORAL DAILY
Qty: 7 TABLET | Refills: 0 | Status: SHIPPED | OUTPATIENT
Start: 2025-08-13 | End: 2025-08-20

## 2025-08-13 RX ORDER — NYSTATIN 100000 [USP'U]/G
POWDER TOPICAL
Qty: 60 G | Refills: 1 | Status: SHIPPED | OUTPATIENT
Start: 2025-08-13